# Patient Record
Sex: FEMALE | Race: BLACK OR AFRICAN AMERICAN | Employment: FULL TIME | URBAN - METROPOLITAN AREA
[De-identification: names, ages, dates, MRNs, and addresses within clinical notes are randomized per-mention and may not be internally consistent; named-entity substitution may affect disease eponyms.]

---

## 2017-11-12 ENCOUNTER — APPOINTMENT (OUTPATIENT)
Dept: GENERAL RADIOLOGY | Age: 47
End: 2017-11-12
Attending: EMERGENCY MEDICINE
Payer: COMMERCIAL

## 2017-11-12 ENCOUNTER — APPOINTMENT (OUTPATIENT)
Dept: CT IMAGING | Age: 47
End: 2017-11-12
Attending: EMERGENCY MEDICINE
Payer: COMMERCIAL

## 2017-11-12 ENCOUNTER — HOSPITAL ENCOUNTER (EMERGENCY)
Age: 47
Discharge: HOME | End: 2017-11-12
Attending: EMERGENCY MEDICINE
Payer: COMMERCIAL

## 2017-11-12 VITALS
RESPIRATION RATE: 24 BRPM | TEMPERATURE: 98.9 F | DIASTOLIC BLOOD PRESSURE: 71 MMHG | WEIGHT: 253.09 LBS | BODY MASS INDEX: 39.72 KG/M2 | HEART RATE: 86 BPM | HEIGHT: 67 IN | OXYGEN SATURATION: 95 % | SYSTOLIC BLOOD PRESSURE: 135 MMHG

## 2017-11-12 DIAGNOSIS — R50.9 FEVER, UNSPECIFIED FEVER CAUSE: ICD-10-CM

## 2017-11-12 DIAGNOSIS — R73.9 HYPERGLYCEMIA: Primary | ICD-10-CM

## 2017-11-12 LAB
ALBUMIN SERPL-MCNC: 3.9 G/DL (ref 3.4–5)
ALBUMIN/GLOB SERPL: 0.9 {RATIO} (ref 0.8–1.7)
ALP SERPL-CCNC: 225 U/L (ref 45–117)
ALT SERPL-CCNC: 60 U/L (ref 13–56)
ANION GAP SERPL CALC-SCNC: 10 MMOL/L (ref 3–18)
APPEARANCE UR: CLEAR
AST SERPL-CCNC: 35 U/L (ref 15–37)
ATRIAL RATE: 102 BPM
BACTERIA URNS QL MICRO: NEGATIVE /HPF
BASOPHILS # BLD: 0.1 K/UL (ref 0–0.06)
BASOPHILS NFR BLD: 0 % (ref 0–2)
BILIRUB DIRECT SERPL-MCNC: 0.1 MG/DL (ref 0–0.2)
BILIRUB SERPL-MCNC: 0.4 MG/DL (ref 0.2–1)
BILIRUB UR QL: NEGATIVE
BUN SERPL-MCNC: 15 MG/DL (ref 7–18)
BUN/CREAT SERPL: 14 (ref 12–20)
CALCIUM SERPL-MCNC: 9.9 MG/DL (ref 8.5–10.1)
CALCULATED P AXIS, ECG09: 72 DEGREES
CALCULATED R AXIS, ECG10: 52 DEGREES
CALCULATED T AXIS, ECG11: 107 DEGREES
CHLORIDE SERPL-SCNC: 97 MMOL/L (ref 100–108)
CK MB CFR SERPL CALC: NORMAL % (ref 0–4)
CK MB SERPL-MCNC: <1 NG/ML (ref 5–25)
CK SERPL-CCNC: 132 U/L (ref 26–192)
CO2 SERPL-SCNC: 31 MMOL/L (ref 21–32)
COLOR UR: YELLOW
CREAT SERPL-MCNC: 1.11 MG/DL (ref 0.6–1.3)
DIAGNOSIS, 93000: NORMAL
DIFFERENTIAL METHOD BLD: ABNORMAL
EOSINOPHIL # BLD: 0 K/UL (ref 0–0.4)
EOSINOPHIL NFR BLD: 0 % (ref 0–5)
EPITH CASTS URNS QL MICRO: NORMAL /LPF (ref 0–5)
ERYTHROCYTE [DISTWIDTH] IN BLOOD BY AUTOMATED COUNT: 13.4 % (ref 11.6–14.5)
FLUAV AG NPH QL IA: NEGATIVE
FLUBV AG NOSE QL IA: NEGATIVE
GLOBULIN SER CALC-MCNC: 4.3 G/DL (ref 2–4)
GLUCOSE BLD STRIP.AUTO-MCNC: 283 MG/DL (ref 70–110)
GLUCOSE BLD STRIP.AUTO-MCNC: 411 MG/DL (ref 70–110)
GLUCOSE SERPL-MCNC: 495 MG/DL (ref 74–99)
GLUCOSE UR STRIP.AUTO-MCNC: >1000 MG/DL
HCG UR QL: NEGATIVE
HCT VFR BLD AUTO: 44.9 % (ref 35–45)
HGB BLD-MCNC: 15 G/DL (ref 12–16)
HGB UR QL STRIP: ABNORMAL
KETONES UR QL STRIP.AUTO: 15 MG/DL
LACTATE BLD-SCNC: 1.6 MMOL/L (ref 0.4–2)
LEUKOCYTE ESTERASE UR QL STRIP.AUTO: NEGATIVE
LIPASE SERPL-CCNC: 108 U/L (ref 73–393)
LYMPHOCYTES # BLD: 1.4 K/UL (ref 0.9–3.6)
LYMPHOCYTES NFR BLD: 9 % (ref 21–52)
MCH RBC QN AUTO: 25.3 PG (ref 24–34)
MCHC RBC AUTO-ENTMCNC: 33.4 G/DL (ref 31–37)
MCV RBC AUTO: 75.6 FL (ref 74–97)
MONOCYTES # BLD: 0.4 K/UL (ref 0.05–1.2)
MONOCYTES NFR BLD: 3 % (ref 3–10)
NEUTS SEG # BLD: 13.6 K/UL (ref 1.8–8)
NEUTS SEG NFR BLD: 88 % (ref 40–73)
NITRITE UR QL STRIP.AUTO: NEGATIVE
P-R INTERVAL, ECG05: 182 MS
PH UR STRIP: 7.5 [PH] (ref 5–8)
PLATELET # BLD AUTO: 286 K/UL (ref 135–420)
PMV BLD AUTO: 12.1 FL (ref 9.2–11.8)
POTASSIUM SERPL-SCNC: 4.5 MMOL/L (ref 3.5–5.5)
PROT SERPL-MCNC: 8.2 G/DL (ref 6.4–8.2)
PROT UR STRIP-MCNC: 300 MG/DL
Q-T INTERVAL, ECG07: 370 MS
QRS DURATION, ECG06: 82 MS
QTC CALCULATION (BEZET), ECG08: 482 MS
RBC # BLD AUTO: 5.94 M/UL (ref 4.2–5.3)
RBC #/AREA URNS HPF: NORMAL /HPF (ref 0–5)
SODIUM SERPL-SCNC: 138 MMOL/L (ref 136–145)
SP GR UR REFRACTOMETRY: >1.03 (ref 1–1.03)
TROPONIN I SERPL-MCNC: 0.02 NG/ML (ref 0–0.06)
UROBILINOGEN UR QL STRIP.AUTO: 0.2 EU/DL (ref 0.2–1)
VENTRICULAR RATE, ECG03: 102 BPM
WBC # BLD AUTO: 15.5 K/UL (ref 4.6–13.2)
WBC URNS QL MICRO: NEGATIVE /HPF (ref 0–4)

## 2017-11-12 PROCEDURE — 87804 INFLUENZA ASSAY W/OPTIC: CPT | Performed by: EMERGENCY MEDICINE

## 2017-11-12 PROCEDURE — 74011636320 HC RX REV CODE- 636/320: Performed by: EMERGENCY MEDICINE

## 2017-11-12 PROCEDURE — 81001 URINALYSIS AUTO W/SCOPE: CPT | Performed by: EMERGENCY MEDICINE

## 2017-11-12 PROCEDURE — 99285 EMERGENCY DEPT VISIT HI MDM: CPT

## 2017-11-12 PROCEDURE — 96375 TX/PRO/DX INJ NEW DRUG ADDON: CPT

## 2017-11-12 PROCEDURE — 83690 ASSAY OF LIPASE: CPT | Performed by: EMERGENCY MEDICINE

## 2017-11-12 PROCEDURE — 74177 CT ABD & PELVIS W/CONTRAST: CPT

## 2017-11-12 PROCEDURE — 83605 ASSAY OF LACTIC ACID: CPT

## 2017-11-12 PROCEDURE — 93005 ELECTROCARDIOGRAM TRACING: CPT

## 2017-11-12 PROCEDURE — 82962 GLUCOSE BLOOD TEST: CPT

## 2017-11-12 PROCEDURE — 96361 HYDRATE IV INFUSION ADD-ON: CPT

## 2017-11-12 PROCEDURE — 74011250636 HC RX REV CODE- 250/636: Performed by: EMERGENCY MEDICINE

## 2017-11-12 PROCEDURE — 71010 XR CHEST PORT: CPT

## 2017-11-12 PROCEDURE — 82550 ASSAY OF CK (CPK): CPT | Performed by: EMERGENCY MEDICINE

## 2017-11-12 PROCEDURE — 96374 THER/PROPH/DIAG INJ IV PUSH: CPT

## 2017-11-12 PROCEDURE — 80048 BASIC METABOLIC PNL TOTAL CA: CPT | Performed by: EMERGENCY MEDICINE

## 2017-11-12 PROCEDURE — 74011636637 HC RX REV CODE- 636/637: Performed by: EMERGENCY MEDICINE

## 2017-11-12 PROCEDURE — 70450 CT HEAD/BRAIN W/O DYE: CPT

## 2017-11-12 PROCEDURE — 81025 URINE PREGNANCY TEST: CPT | Performed by: EMERGENCY MEDICINE

## 2017-11-12 PROCEDURE — 80076 HEPATIC FUNCTION PANEL: CPT | Performed by: EMERGENCY MEDICINE

## 2017-11-12 PROCEDURE — 85025 COMPLETE CBC W/AUTO DIFF WBC: CPT | Performed by: EMERGENCY MEDICINE

## 2017-11-12 PROCEDURE — 74011250637 HC RX REV CODE- 250/637: Performed by: EMERGENCY MEDICINE

## 2017-11-12 RX ORDER — NAPROXEN 500 MG/1
500 TABLET ORAL 2 TIMES DAILY WITH MEALS
COMMUNITY

## 2017-11-12 RX ORDER — HYDRALAZINE HYDROCHLORIDE 50 MG/1
50 TABLET, FILM COATED ORAL 3 TIMES DAILY
COMMUNITY

## 2017-11-12 RX ORDER — CARVEDILOL 6.25 MG/1
25 TABLET ORAL
Status: COMPLETED | OUTPATIENT
Start: 2017-11-12 | End: 2017-11-12

## 2017-11-12 RX ORDER — FUROSEMIDE 20 MG/1
20 TABLET ORAL DAILY
COMMUNITY

## 2017-11-12 RX ORDER — LISINOPRIL 20 MG/1
20 TABLET ORAL DAILY
COMMUNITY
End: 2020-03-04

## 2017-11-12 RX ORDER — GUAIFENESIN 100 MG/5ML
81 LIQUID (ML) ORAL DAILY
COMMUNITY

## 2017-11-12 RX ORDER — CARVEDILOL 25 MG/1
25 TABLET ORAL 2 TIMES DAILY WITH MEALS
COMMUNITY

## 2017-11-12 RX ORDER — LOVASTATIN 40 MG/1
40 TABLET ORAL
COMMUNITY

## 2017-11-12 RX ORDER — ONDANSETRON 2 MG/ML
4 INJECTION INTRAMUSCULAR; INTRAVENOUS
Status: COMPLETED | OUTPATIENT
Start: 2017-11-12 | End: 2017-11-12

## 2017-11-12 RX ORDER — AMLODIPINE BESYLATE 5 MG/1
10 TABLET ORAL DAILY
Status: DISCONTINUED | OUTPATIENT
Start: 2017-11-12 | End: 2017-11-12

## 2017-11-12 RX ORDER — NITROGLYCERIN 0.4 MG/1
0.4 TABLET SUBLINGUAL
COMMUNITY

## 2017-11-12 RX ORDER — AMLODIPINE BESYLATE 5 MG/1
10 TABLET ORAL
Status: COMPLETED | OUTPATIENT
Start: 2017-11-12 | End: 2017-11-12

## 2017-11-12 RX ORDER — AMLODIPINE BESYLATE 10 MG/1
10 TABLET ORAL DAILY
COMMUNITY

## 2017-11-12 RX ORDER — AMOXICILLIN AND CLAVULANATE POTASSIUM 875; 125 MG/1; MG/1
1 TABLET, FILM COATED ORAL 2 TIMES DAILY
Qty: 14 TAB | Refills: 0 | Status: SHIPPED | OUTPATIENT
Start: 2017-11-12 | End: 2017-11-19

## 2017-11-12 RX ORDER — AMOXICILLIN AND CLAVULANATE POTASSIUM 875; 125 MG/1; MG/1
1 TABLET, FILM COATED ORAL
Status: COMPLETED | OUTPATIENT
Start: 2017-11-12 | End: 2017-11-12

## 2017-11-12 RX ORDER — ERGOCALCIFEROL 1.25 MG/1
50000 CAPSULE ORAL
COMMUNITY

## 2017-11-12 RX ORDER — GLIPIZIDE 10 MG/1
10 TABLET ORAL 2 TIMES DAILY
COMMUNITY

## 2017-11-12 RX ORDER — METFORMIN HYDROCHLORIDE 1000 MG/1
1000 TABLET ORAL 2 TIMES DAILY WITH MEALS
COMMUNITY

## 2017-11-12 RX ORDER — ACETAMINOPHEN 500 MG
1000 TABLET ORAL ONCE
Status: COMPLETED | OUTPATIENT
Start: 2017-11-12 | End: 2017-11-12

## 2017-11-12 RX ORDER — CARVEDILOL 6.25 MG/1
25 TABLET ORAL 2 TIMES DAILY WITH MEALS
Status: DISCONTINUED | OUTPATIENT
Start: 2017-11-12 | End: 2017-11-12

## 2017-11-12 RX ORDER — HYDRALAZINE HYDROCHLORIDE 25 MG/1
50 TABLET, FILM COATED ORAL 3 TIMES DAILY
Status: DISCONTINUED | OUTPATIENT
Start: 2017-11-12 | End: 2017-11-12

## 2017-11-12 RX ORDER — HYDRALAZINE HYDROCHLORIDE 25 MG/1
50 TABLET, FILM COATED ORAL
Status: COMPLETED | OUTPATIENT
Start: 2017-11-12 | End: 2017-11-12

## 2017-11-12 RX ORDER — ACETAMINOPHEN 325 MG/1
650 TABLET ORAL ONCE
Status: DISCONTINUED | OUTPATIENT
Start: 2017-11-12 | End: 2017-11-12 | Stop reason: HOSPADM

## 2017-11-12 RX ADMIN — AMOXICILLIN AND CLAVULANATE POTASSIUM 1 TABLET: 875; 125 TABLET, FILM COATED ORAL at 11:36

## 2017-11-12 RX ADMIN — SODIUM CHLORIDE 1000 ML: 900 INJECTION, SOLUTION INTRAVENOUS at 08:52

## 2017-11-12 RX ADMIN — SODIUM CHLORIDE 1000 ML: 900 INJECTION, SOLUTION INTRAVENOUS at 10:58

## 2017-11-12 RX ADMIN — HYDRALAZINE HYDROCHLORIDE 50 MG: 25 TABLET, FILM COATED ORAL at 09:42

## 2017-11-12 RX ADMIN — CARVEDILOL 25 MG: 6.25 TABLET, FILM COATED ORAL at 09:41

## 2017-11-12 RX ADMIN — INSULIN HUMAN 11 UNITS: 100 INJECTION, SOLUTION PARENTERAL at 09:42

## 2017-11-12 RX ADMIN — AMLODIPINE BESYLATE 10 MG: 5 TABLET ORAL at 09:42

## 2017-11-12 RX ADMIN — ACETAMINOPHEN 1000 MG: 500 TABLET ORAL at 08:48

## 2017-11-12 RX ADMIN — IOPAMIDOL 100 ML: 612 INJECTION, SOLUTION INTRAVENOUS at 09:33

## 2017-11-12 RX ADMIN — ONDANSETRON 4 MG: 2 INJECTION INTRAMUSCULAR; INTRAVENOUS at 10:29

## 2017-11-12 NOTE — ED NOTES
Report given to [facility]. Tylenol reordered due to possibility of vomiting last dose. When rechecked temp, now 98.9. Dr. Ana Middleton aware and Tylenol held. Patient condition improving, more alert now. patient's mother suggests treating patient for possible sinus infection since source of fever unfound. Will continue to monitor closely while awaiting further orders.

## 2017-11-12 NOTE — LETTER
[address] [place] [address] [facility] 
[address] [facility] [address] 
[phone] Work/School Note Date: 11/12/2017 To Whom It May concern: 
 
Tod Farrar was seen and treated today in the emergency room by the following provider(s): 
No providers found. Tod Farrar may return to work on 11/15/17 Suzanna López Sincerely, 
 
 
 
 
Kee Fournier RN

## 2017-11-12 NOTE — LETTER
[address] [place] [address] [facility] 
[address] [facility] [address] [phone] Work/School Note Date: 11/12/2017 To Whom It May concern: 
 
Rickie Miranda was seen and treated today in the emergency room by the following provider(s): 
No providers found. Rickie Miranda may return to work on 11/14/17 Cielo Dao Sincerely, 
 
 
 
 
Hilaria Ramirez RN

## 2017-11-12 NOTE — ED PROVIDER NOTES
HPI Comments: 8:31 AM: Joseluis Alexandre is a 49y.o. year old female with hx of DM ad HTN presenting to the ED with c/o constant and increasing AMS onset 10 hours. Wife states pt was baseline yesterday and then around 11pm (10 hrs ago) pt began complaining of dizziness, left eye blurred vision, and HA. Pt sx worsened to include fever, nausea, diarrhea, and being in and out of responsiveness per wife. Denies SOB, CP, bilateral ear pain, dysuria, bilateral LE edema, or abdominal pain. Pt has been inconsistent with medications secondary to trying to conserve quantity before refill. Takes oral DM meds, no insulin. Denies ETOH/drug use. Pt mother and wife at bedside. The history is provided by the patient and the spouse. Past Medical History:   Diagnosis Date    Cardiomyopathy [facility])     CHF (congestive heart failure) (HCC)     Diabetes mellitus ([facility]     Diabetic neuropathy (HCC)     Hypertension     Microalbuminuria     Mixed hyperlipidemia     Obesity     Ovarian cancer ([facility]     Vitamin D deficiency        Past Surgical History:   Procedure Laterality Date    VAG HYST,UTERUS >250 GMS,REM TUBE/OVARY           No family history on file. Social History     Social History    Marital status: SINGLE     Spouse name: N/A    Number of children: N/A    Years of education: N/A     Occupational History    Not on file. Social History Main Topics    Smoking status: Not on file    Smokeless tobacco: Not on file    Alcohol use Not on file    Drug use: Not on file    Sexual activity: Not on file     Other Topics Concern    Not on file     Social History Narrative    No narrative on file         ALLERGIES: Review of patient's allergies indicates no known allergies. Review of Systems   Constitutional: Positive for fever. Eyes: Positive for visual disturbance (left eye blurred ). Respiratory: Negative for shortness of breath.     Cardiovascular: Negative for chest pain and leg swelling. Gastrointestinal: Positive for diarrhea and nausea. Negative for abdominal pain. Genitourinary: Negative for dysuria. Neurological: Positive for dizziness and headaches. AMS   All other systems reviewed and are negative. Vitals:    11/12/17 0817 11/12/17 0900   BP: (!) 203/87 (!) 214/78   Pulse: 100 (!) 107   Resp: 26 25   Temp: (!) 102.7 °F (39.3 °C)    SpO2: 100% 96%   Weight: 114.8 kg (253 lb 1.4 oz)    Height: 5' 7\" (1.702 m)             Physical Exam   Constitutional: She is oriented to person, place, and time. She appears well-developed. HENT:   Head: Normocephalic and atraumatic. Eyes: EOM are normal. Pupils are equal, round, and reactive to light. Rt eye chronically discong gaze, TMs clear bilaterally, OP clear no exudates   Neck: Normal range of motion. Cardiovascular: Regular rhythm. Exam reveals no gallop and no friction rub. No murmur heard. Mild Tachycardia   Pulmonary/Chest: Effort normal and breath sounds normal. No stridor. No respiratory distress. She has no wheezes. Abdominal: Soft. There is no tenderness. Musculoskeletal: Normal range of motion. She exhibits no tenderness. Neurological: She is oriented to person, place, and time. Mildly lethargic but still responsive and answers questions   Skin: Skin is warm and dry. She is not diaphoretic. Psychiatric: She has a normal mood and affect. Her behavior is normal.   Nursing note and vitals reviewed. MDM  Number of Diagnoses or Management Options  Hyperglycemia:   Diagnosis management comments: Pt here febrile, mildly altered, and hypertensive + hyperglycemia to 400's off her HTN and DM meds for the past week while awaiting insurance. Her AMS could be due to metabolic issues such as DM- less likely toxic as mother and wife have been close to her and do not report any use. Could be hypertensive emergency so will check CT head for any issues, bleed. Sx for 12 hours now so no stroke activation. Working up fever with flu swab, UA, CXR. Ab is non tender but will check CT for any intra ab process. TM were clear, no rashes noted anywhere.  If likely DKA treatment does not improve sx or if no other cause results from the labs, may need to consider LP for mengingitis, but lower likelihood due to lack of neck issues, severe headache, etc.     ED Course       Procedures      Vitals:  Patient Vitals for the past 12 hrs:   Temp Pulse Resp BP SpO2   11/12/17 1139 98.9 °F (37.2 °C) - - - -   11/12/17 1130 - (!) 101 22 169/77 99 %   11/12/17 1117 98.9 °F (37.2 °C) - - - -   11/12/17 1100 - (!) 101 22 195/87 98 %   11/12/17 0900 - (!) 107 25 (!) 214/78 96 %   11/12/17 0817 (!) 102.7 °F (39.3 °C) 100 26 (!) 203/87 100 %       Medications Ordered:  Medications   sodium chloride 0.9 % bolus infusion 1,000 mL (0 mL IntraVENous IV Completed 11/12/17 0948)   acetaminophen (TYLENOL) tablet 1,000 mg (1,000 mg Oral Given 11/12/17 0848)   insulin regular (NOVOLIN R, HUMULIN R) injection 11 Units (11 Units IntraVENous Given 11/12/17 0942)   iopamidol (ISOVUE 300) 61 % contrast injection 100-500 mL (100 mL IntraVENous Given 11/12/17 0933)   amLODIPine (NORVASC) tablet 10 mg (10 mg Oral Given 11/12/17 0942)   carvedilol (COREG) tablet 25 mg (25 mg Oral Given 11/12/17 0941)   hydrALAZINE (APRESOLINE) tablet 50 mg (50 mg Oral Given 11/12/17 0942)   ondansetron (ZOFRAN) injection 4 mg (4 mg IntraVENous Given 11/12/17 1029)       Lab Findings:  Recent Results (from the past 12 hour(s))   GLUCOSE, POC    Collection Time: 11/12/17  8:21 AM   Result Value Ref Range    Glucose (POC) 411 (HH) 70 - 110 mg/dL   POC LACTIC ACID    Collection Time: 11/12/17  8:26 AM   Result Value Ref Range    Lactic Acid (POC) 1.6 0.4 - 2.0 mmol/L   CBC WITH AUTOMATED DIFF    Collection Time: 11/12/17  8:30 AM   Result Value Ref Range    WBC 15.5 (H) 4.6 - 13.2 K/uL    RBC 5.94 (H) 4.20 - 5.30 M/uL    HGB 15.0 12.0 - 16.0 g/dL    HCT 44.9 35.0 - 45.0 % MCV 75.6 74.0 - 97.0 FL    MCH 25.3 24.0 - 34.0 PG    MCHC 33.4 31.0 - 37.0 g/dL    RDW 13.4 11.6 - 14.5 %    PLATELET 896 767 - 636 K/uL    MPV 12.1 (H) 9.2 - 11.8 FL    NEUTROPHILS 88 (H) 40 - 73 %    LYMPHOCYTES 9 (L) 21 - 52 %    MONOCYTES 3 3 - 10 %    EOSINOPHILS 0 0 - 5 %    BASOPHILS 0 0 - 2 %    ABS. NEUTROPHILS 13.6 (H) 1.8 - 8.0 K/UL    ABS. LYMPHOCYTES 1.4 0.9 - 3.6 K/UL    ABS. MONOCYTES 0.4 0.05 - 1.2 K/UL    ABS. EOSINOPHILS 0.0 0.0 - 0.4 K/UL    ABS. BASOPHILS 0.1 (H) 0.0 - 0.06 K/UL    DF AUTOMATED     METABOLIC PANEL, BASIC    Collection Time: 11/12/17  8:30 AM   Result Value Ref Range    Sodium 138 136 - 145 mmol/L    Potassium 4.5 3.5 - 5.5 mmol/L    Chloride 97 (L) 100 - 108 mmol/L    CO2 31 21 - 32 mmol/L    Anion gap 10 3.0 - 18 mmol/L    Glucose 495 (HH) 74 - 99 mg/dL    BUN 15 7.0 - 18 MG/DL    Creatinine 1.11 0.6 - 1.3 MG/DL    BUN/Creatinine ratio 14 12 - 20      GFR est AA >60 >60 ml/min/1.73m2    GFR est non-AA 52 (L) >60 ml/min/1.73m2    Calcium 9.9 8.5 - 10.1 MG/DL   HEPATIC FUNCTION PANEL    Collection Time: 11/12/17  8:30 AM   Result Value Ref Range    Protein, total 8.2 6.4 - 8.2 g/dL    Albumin 3.9 3.4 - 5.0 g/dL    Globulin 4.3 (H) 2.0 - 4.0 g/dL    A-G Ratio 0.9 0.8 - 1.7      Bilirubin, total 0.4 0.2 - 1.0 MG/DL    Bilirubin, direct 0.1 0.0 - 0.2 MG/DL    Alk.  phosphatase 225 (H) 45 - 117 U/L    AST (SGOT) 35 15 - 37 U/L    ALT (SGPT) 60 (H) 13 - 56 U/L   LIPASE    Collection Time: 11/12/17  8:30 AM   Result Value Ref Range    Lipase 108 73 - 393 U/L   CARDIAC PANEL,(CK, CKMB & TROPONIN)    Collection Time: 11/12/17  8:30 AM   Result Value Ref Range     26 - 192 U/L    CK - MB <1.0 <3.6 ng/ml    CK-MB Index  0.0 - 4.0 %     CALCULATION NOT PERFORMED WHEN RESULT IS BELOW LINEAR LIMIT    Troponin-I, Qt. 0.02 0.00 - 0.06 NG/ML   EKG, 12 LEAD, INITIAL    Collection Time: 11/12/17  8:43 AM   Result Value Ref Range    Ventricular Rate 102 BPM    Atrial Rate 102 BPM    P-R Interval 182 ms    QRS Duration 82 ms    Q-T Interval 370 ms    QTC Calculation (Bezet) 482 ms    Calculated P Axis 72 degrees    Calculated R Axis 52 degrees    Calculated T Axis 107 degrees    Diagnosis       Sinus tachycardia with occasional premature ventricular complexes  Marked ST abnormality, possible lateral subendocardial injury  Abnormal ECG  No previous ECGs available     INFLUENZA A & B AG (RAPID TEST)    Collection Time: 11/12/17  8:45 AM   Result Value Ref Range    Influenza A Antigen NEGATIVE  NEG      Influenza B Antigen NEGATIVE  NEG     URINALYSIS W/ RFLX MICROSCOPIC    Collection Time: 11/12/17  9:25 AM   Result Value Ref Range    Color YELLOW      Appearance CLEAR      Specific gravity >1.030 (H) 1.005 - 1.030    pH (UA) 7.5 5.0 - 8.0      Protein 300 (A) NEG mg/dL    Glucose >1000 (A) NEG mg/dL    Ketone 15 (A) NEG mg/dL    Bilirubin NEGATIVE  NEG      Blood SMALL (A) NEG      Urobilinogen 0.2 0.2 - 1.0 EU/dL    Nitrites NEGATIVE  NEG      Leukocyte Esterase NEGATIVE  NEG     HCG URINE, QL    Collection Time: 11/12/17  9:25 AM   Result Value Ref Range    HCG urine, Ql. NEGATIVE  NEG     URINE MICROSCOPIC ONLY    Collection Time: 11/12/17  9:25 AM   Result Value Ref Range    WBC NEGATIVE  0 - 4 /hpf    RBC 0 to 3 0 - 5 /hpf    Epithelial cells 2+ 0 - 5 /lpf    Bacteria NEGATIVE  NEG /hpf       EKG Interpretation by ED physician:  8:46 AM.  Sinus Tachycardic. Rate 102. PVC. Non-specific ST changes. X-ray, CT or radiology findings or impressions:  XR CHEST PORT   Final Result   IMPRESSION:     No acute findings. CT HEAD WO CONT      Final Result  IMPRESSION:       No acute intracranial process. Mild sequela of chronic microvascular ischemic disease. Trace right mastoid effusion. CT ABD PELV W CONT   Final Result  IMPRESSION:      Hepatomegaly. Diagnosis:   1. Hyperglycemia      Reevaluation:  10:52 AM - Family says pt was never altered, just fatigued.   Presented current lab test finding and CT results. Discussed possibly of lumbar puncture which was declined so far. Blood glucose improved down to 280s after insulin. Pt still mildy tachycardic, will try additional fluids. Family would like to go home if at all possible. 11:20AM- pt very much improved after fluids, insulin, tylenol. Now afebrile, not tachy any more. Still declining further work up. Again reiterated neg work up so far, perhaps maybe a trace mastoid effusion but no clinical signs of mastoiditis. Pt does have hx of sinus infections so I am fine starting her on augmentin now, but pt and family fully understand that there could still be a more serious infection, sepsis or other issue currently. They will assure med compliance, and her HTN has improved after I gave her a home dose of her meds. We are currently planning for outpt management of her issues, with very close PMD follow up.    12:50PM- re eval- now heart rate in 80's, back to baseline per family. Her wife called in all of her medications so she has everything again. I counseled them on DM and HTN management. She tolerated the augmentin well- I re-iterated all of the above and the worry that she may have another occult infection, but she was back to baseline and normal vitals and much improved at discharge.     -They verbally convey understanding and agreement of the signs, symptoms, diagnosis, treatment and prognosis and additionally agree to follow up as discussed. All questions were answered, and we reviewed pertinent return precautions as seen in the discharge paperwork. Pt understood follow up instructions, and would return to the ED if any worsening or concerns. Disposition: Discharge    Follow-up Information     None           Patient's Medications   Start Taking    No medications on file   Continue Taking    AMLODIPINE (NORVASC) 10 MG TABLET    Take 10 mg by mouth daily. ASPIRIN 81 MG CHEWABLE TABLET    Take 81 mg by mouth daily.     CARVEDILOL (COREG) 25 MG TABLET    Take 25 mg by mouth two (2) times daily (with meals). ERGOCALCIFEROL (DRISDOL) 50,000 UNIT CAPSULE    Take 50,000 Units by mouth. FUROSEMIDE (LASIX) 20 MG TABLET    Take 20 mg by mouth daily. GLIPIZIDE (GLUCOTROL) 10 MG TABLET    Take 10 mg by mouth two (2) times a day. HYDRALAZINE (APRESOLINE) 50 MG TABLET    Take 50 mg by mouth three (3) times daily. LISINOPRIL (PRINIVIL, ZESTRIL) 20 MG TABLET    Take 20 mg by mouth daily. LOVASTATIN (MEVACOR) 40 MG TABLET    Take 40 mg by mouth nightly. METFORMIN (GLUCOPHAGE) 1,000 MG TABLET    Take 1,000 mg by mouth two (2) times daily (with meals). NAPROXEN (NAPROSYN) 500 MG TABLET    Take 500 mg by mouth two (2) times daily (with meals). NITROGLYCERIN (NITROSTAT) 0.4 MG SL TABLET    0.4 mg by SubLINGual route every five (5) minutes as needed for Chest Pain. These Medications have changed    No medications on file   Stop Taking    No medications on file       Scribe Amparo Montano [address] acting as a scribe for and in the presence of Geri Crawford MD      November 12, 2017 at 8:56 AM       Provider Attestation:      I personally performed the services described in the documentation, reviewed the documentation, as recorded by the scribe in my presence, and it accurately and completely records my words and actions.  November 12, 2017 at 8:56 AM - Geri Crawford MD

## 2017-11-12 NOTE — ED NOTES
Patient back from CT. Per CT tech, patient vomited following scan. PO medications scanned in computer while patient getting ready to go to scan and not taken yet. Dr. Oconnor Look aware and verbal order obtained for Zofran. Patient now resting quietly.

## 2017-11-12 NOTE — ED TRIAGE NOTES
Per wife, patient was c/o dizziness last night and blurred left eye. This morning was confused, vomited and had diarrhea multiple times.  Febrile upon arrival

## 2017-11-12 NOTE — DISCHARGE INSTRUCTIONS
Learning About Fever  What is a fever? A fever is a high body temperature. It's one way your body fights being sick. A fever shows that the body is responding to infection or other illnesses, both minor and severe. A fever is a symptom, not an illness by itself. A fever can be a sign that you are ill, but most fevers are not caused by a serious problem. You may have a fever with a minor illness, such as a cold. But sometimes a very serious infection may cause little or no fever. It is important to look at other symptoms, other conditions you have, and how you feel in general. In children, notice how they act and see what symptoms they complain of. What is a normal body temperature? A normal body temperature is about 98. 6ºF. Some people have a normal temperature that is a little higher or a little lower than this. Your temperature may be a little lower in the morning than it is later in the day. It may go up during hot weather or when you exercise, wear heavy clothes, or take a hot bath. Your temperature may also be different depending on how you take it. A temperature taken in the mouth (oral) or under the arm may be a little lower than your core temperature (rectal). What is a fever temperature? A core temperature of 100.4°F or above is considered a fever. What can cause a fever? A fever may be caused by:  · Infections. This is the most common cause of a fever. Examples of infections that can cause a fever include the flu, a kidney infection, or pneumonia. · Some medicines. · Severe trauma or injury, such as a heart attack, stroke, heatstroke, or burns. · Other medical conditions, such as arthritis and some cancers. How can you treat a fever at home? · Ask your doctor if you can take an over-the-counter pain medicine, such as acetaminophen (Tylenol), ibuprofen (Advil, Motrin), or naproxen (Aleve). Be safe with medicines. Read and follow all instructions on the label.   · To prevent dehydration, drink plenty of fluids. Choose water and other caffeine-free clear liquids until you feel better. If you have kidney, heart, or liver disease and have to limit fluids, talk with your doctor before you increase the amount of fluids you drink. Follow-up care is a key part of your treatment and safety. Be sure to make and go to all appointments, and call your doctor if you are having problems. It's also a good idea to know your test results and keep a list of the medicines you take. Where can you learn more? Go to http://aparna-rishi.info/. Enter A316 in the search box to learn more about \"Learning About Fever. \"  Current as of: March 20, 2017  Content Version: 11.4  © 6003-3137 [employer]. Care instructions adapted under license by [employer] (which disclaims liability or warranty for this information). If you have questions about a medical condition or this instruction, always ask your healthcare professional. [address] any warranty or liability for your use of this information.

## 2019-01-20 ENCOUNTER — APPOINTMENT (OUTPATIENT)
Dept: GENERAL RADIOLOGY | Age: 49
End: 2019-01-20
Attending: EMERGENCY MEDICINE
Payer: COMMERCIAL

## 2019-01-20 ENCOUNTER — HOSPITAL ENCOUNTER (EMERGENCY)
Age: 49
Discharge: HOME | End: 2019-01-20
Attending: EMERGENCY MEDICINE | Admitting: EMERGENCY MEDICINE
Payer: COMMERCIAL

## 2019-01-20 ENCOUNTER — APPOINTMENT (OUTPATIENT)
Dept: CT IMAGING | Age: 49
End: 2019-01-20
Attending: EMERGENCY MEDICINE
Payer: COMMERCIAL

## 2019-01-20 VITALS
BODY MASS INDEX: 35.99 KG/M2 | HEIGHT: 69 IN | OXYGEN SATURATION: 100 % | WEIGHT: 243 LBS | SYSTOLIC BLOOD PRESSURE: 202 MMHG | DIASTOLIC BLOOD PRESSURE: 88 MMHG | HEART RATE: 82 BPM | RESPIRATION RATE: 17 BRPM

## 2019-01-20 DIAGNOSIS — R07.89 ATYPICAL CHEST PAIN: ICD-10-CM

## 2019-01-20 DIAGNOSIS — R73.9 HYPERGLYCEMIA: Primary | ICD-10-CM

## 2019-01-20 DIAGNOSIS — Z91.199 MEDICAL NON-COMPLIANCE: ICD-10-CM

## 2019-01-20 LAB
ADMINISTERED INITIALS, ADMINIT: NORMAL
ADMINISTERED INITIALS, ADMINIT: NORMAL
ALBUMIN SERPL-MCNC: 3.5 G/DL (ref 3.4–5)
ALBUMIN/GLOB SERPL: 0.7 {RATIO} (ref 0.8–1.7)
ALP SERPL-CCNC: 144 U/L (ref 45–117)
ALT SERPL-CCNC: 21 U/L (ref 13–56)
ANION GAP SERPL CALC-SCNC: 11 MMOL/L (ref 3–18)
AST SERPL-CCNC: 13 U/L (ref 15–37)
BASOPHILS # BLD: 0.1 K/UL (ref 0–0.1)
BASOPHILS NFR BLD: 1 % (ref 0–2)
BILIRUB SERPL-MCNC: 0.3 MG/DL (ref 0.2–1)
BNP SERPL-MCNC: 556 PG/ML (ref 0–450)
BUN SERPL-MCNC: 12 MG/DL (ref 7–18)
BUN/CREAT SERPL: 13 (ref 12–20)
CALCIUM SERPL-MCNC: 9.8 MG/DL (ref 8.5–10.1)
CHLORIDE SERPL-SCNC: 98 MMOL/L (ref 100–108)
CK MB CFR SERPL CALC: 0.9 % (ref 0–4)
CK MB SERPL-MCNC: 1.3 NG/ML (ref 5–25)
CK SERPL-CCNC: 142 U/L (ref 26–192)
CO2 SERPL-SCNC: 27 MMOL/L (ref 21–32)
CREAT SERPL-MCNC: 0.9 MG/DL (ref 0.6–1.3)
D50 ADMINISTERED, D50ADM: 0 ML
D50 ADMINISTERED, D50ADM: 0 ML
D50 ORDER, D50ORD: 0 ML
D50 ORDER, D50ORD: 0 ML
DIFFERENTIAL METHOD BLD: ABNORMAL
EOSINOPHIL # BLD: 0.2 K/UL (ref 0–0.4)
EOSINOPHIL NFR BLD: 1 % (ref 0–5)
ERYTHROCYTE [DISTWIDTH] IN BLOOD BY AUTOMATED COUNT: 14.9 % (ref 11.6–14.5)
EST. AVERAGE GLUCOSE BLD GHB EST-MCNC: 301 MG/DL
GLOBULIN SER CALC-MCNC: 4.9 G/DL (ref 2–4)
GLUCOSE BLD STRIP.AUTO-MCNC: 200 MG/DL (ref 70–110)
GLUCOSE BLD STRIP.AUTO-MCNC: 251 MG/DL (ref 70–110)
GLUCOSE SERPL-MCNC: 385 MG/DL (ref 74–99)
GLUCOSE, GLC: 251 MG/DL
GLUCOSE, GLC: 385 MG/DL
HBA1C MFR BLD: 12.1 % (ref 4.2–5.6)
HCT VFR BLD AUTO: 41.5 % (ref 35–45)
HGB BLD-MCNC: 13.8 G/DL (ref 12–16)
HIGH TARGET, HITG: 180 MG/DL
HIGH TARGET, HITG: 180 MG/DL
INSULIN ADMINSTERED, INSADM: 1.9 UNITS/HOUR
INSULIN ADMINSTERED, INSADM: 6.5 UNITS/HOUR
INSULIN ORDER, INSORD: 1.9 UNITS/HOUR
INSULIN ORDER, INSORD: 6.5 UNITS/HOUR
LOW TARGET, LOT: 140 MG/DL
LOW TARGET, LOT: 140 MG/DL
LYMPHOCYTES # BLD: 3.3 K/UL (ref 0.9–3.6)
LYMPHOCYTES NFR BLD: 25 % (ref 21–52)
MCH RBC QN AUTO: 25.8 PG (ref 24–34)
MCHC RBC AUTO-ENTMCNC: 33.3 G/DL (ref 31–37)
MCV RBC AUTO: 77.7 FL (ref 74–97)
MINUTES UNTIL NEXT BG, NBG: 60 MIN
MINUTES UNTIL NEXT BG, NBG: 60 MIN
MONOCYTES # BLD: 1 K/UL (ref 0.05–1.2)
MONOCYTES NFR BLD: 8 % (ref 3–10)
MULTIPLIER, MUL: 0.01
MULTIPLIER, MUL: 0.02
NEUTS SEG # BLD: 8.7 K/UL (ref 1.8–8)
NEUTS SEG NFR BLD: 65 % (ref 40–73)
ORDER INITIALS, ORDINIT: NORMAL
ORDER INITIALS, ORDINIT: NORMAL
PLATELET # BLD AUTO: 344 K/UL (ref 135–420)
PMV BLD AUTO: 12 FL (ref 9.2–11.8)
POTASSIUM SERPL-SCNC: 3.9 MMOL/L (ref 3.5–5.5)
PROT SERPL-MCNC: 8.4 G/DL (ref 6.4–8.2)
RBC # BLD AUTO: 5.34 M/UL (ref 4.2–5.3)
SODIUM SERPL-SCNC: 136 MMOL/L (ref 136–145)
TROPONIN I SERPL-MCNC: <0.02 NG/ML (ref 0–0.06)
WBC # BLD AUTO: 13.2 K/UL (ref 4.6–13.2)

## 2019-01-20 PROCEDURE — 83036 HEMOGLOBIN GLYCOSYLATED A1C: CPT

## 2019-01-20 PROCEDURE — 74011636637 HC RX REV CODE- 636/637: Performed by: EMERGENCY MEDICINE

## 2019-01-20 PROCEDURE — 71046 X-RAY EXAM CHEST 2 VIEWS: CPT

## 2019-01-20 PROCEDURE — 82962 GLUCOSE BLOOD TEST: CPT

## 2019-01-20 PROCEDURE — 70450 CT HEAD/BRAIN W/O DYE: CPT

## 2019-01-20 PROCEDURE — 83880 ASSAY OF NATRIURETIC PEPTIDE: CPT

## 2019-01-20 PROCEDURE — 85025 COMPLETE CBC W/AUTO DIFF WBC: CPT

## 2019-01-20 PROCEDURE — 99285 EMERGENCY DEPT VISIT HI MDM: CPT

## 2019-01-20 PROCEDURE — 93005 ELECTROCARDIOGRAM TRACING: CPT

## 2019-01-20 PROCEDURE — 96366 THER/PROPH/DIAG IV INF ADDON: CPT

## 2019-01-20 PROCEDURE — 82550 ASSAY OF CK (CPK): CPT

## 2019-01-20 PROCEDURE — 74011000258 HC RX REV CODE- 258: Performed by: EMERGENCY MEDICINE

## 2019-01-20 PROCEDURE — 80053 COMPREHEN METABOLIC PANEL: CPT

## 2019-01-20 PROCEDURE — 96365 THER/PROPH/DIAG IV INF INIT: CPT

## 2019-01-20 RX ORDER — DEXTROSE 50 % IN WATER (D50W) INTRAVENOUS SYRINGE
25-50 AS NEEDED
Status: DISCONTINUED | OUTPATIENT
Start: 2019-01-20 | End: 2019-01-20 | Stop reason: HOSPADM

## 2019-01-20 RX ORDER — MAGNESIUM SULFATE 100 %
4 CRYSTALS MISCELLANEOUS AS NEEDED
Status: DISCONTINUED | OUTPATIENT
Start: 2019-01-20 | End: 2019-01-20 | Stop reason: HOSPADM

## 2019-01-20 RX ADMIN — SODIUM CHLORIDE 6.5 UNITS/HR: 900 INJECTION, SOLUTION INTRAVENOUS at 12:55

## 2019-01-20 NOTE — LETTER
NOTIFICATION RETURN TO WORK / SCHOOL 
 
1/20/2019 3:25 PM 
 
Ms. NICHOLAS Martines [address] [facility] [address] To Whom It May Concern: 
 
Isaiah Solomon is currently under the care of [id number] [facility] EMERGENCY DEPT. She will return to work/school on: Tuesday, January 22, 2019 If there are questions or concerns please have the patient contact our office.  
 
 
 
Sincerely, 
 
 
Preston Li MD

## 2019-01-20 NOTE — ED PROVIDER NOTES
EMERGENCY DEPARTMENT HISTORY AND PHYSICAL EXAM 
 
12:18 PM 
 
 
Date: 1/20/2019 Patient Name: Nella Bynum History of Presenting Illness Chief Complaint Patient presents with  Chest Pain  Numbness History Provided By: Patient Chief Complaint: Numbness; Chest Pain Duration:  Days Timing:  Constant Location: Left arm Quality: N/A Severity: N/A Modifying Factors: None Associated Symptoms: denies any other associated signs or symptoms Additional History (Context): Nella Bynum is a 51 y.o. female with PMHx of HTN, DM, and CHF presenting to the ED c/o constant left arm numbness for the past 3 days. Pt also reports chest pain 5 days ago, states that resolved and denies any chest pain since then. Reports no modifying factors for her symptoms. Denies shortness of breath, abdominal pain, and any other symptoms or complaints. PCP: Shannan Landau, MD 
 
Current Facility-Administered Medications Medication Dose Route Frequency Provider Last Rate Last Dose  glucose chewable tablet 16 g  4 Tab Oral PRN Halle Marc MD      
 [facility]) injection 1 mg  1 mg IntraMUSCular PRN Halle Marc MD      
 dextrose (D50W) injection syrg 12.5-25 g  25-50 mL IntraVENous PRN Halle Marc MD      
 insulin regular (NOVOLIN R, HUMULIN R) 100 Units in 0.9% sodium chloride 100 mL infusion  0-50 Units/hr IntraVENous TITRATE Halle Marc MD 1.9 mL/hr at 01/20/19 1357 1.9 Units/hr at 01/20/19 1357 Current Outpatient Medications Medication Sig Dispense Refill  ergocalciferol (DRISDOL) 50,000 unit capsule Take 50,000 Units by mouth.  lovastatin (MEVACOR) 40 mg tablet Take 40 mg by mouth nightly.  amLODIPine (NORVASC) 10 mg tablet Take 10 mg by mouth daily.  furosemide (LASIX) 20 mg tablet Take 20 mg by mouth daily.  carvedilol (COREG) 25 mg tablet Take 25 mg by mouth two (2) times daily (with meals).  glipiZIDE (GLUCOTROL) 10 mg tablet Take 10 mg by mouth two (2) times a day.  naproxen (NAPROSYN) 500 mg tablet Take 500 mg by mouth two (2) times daily (with meals).  hydrALAZINE (APRESOLINE) 50 mg tablet Take 50 mg by mouth three (3) times daily.  aspirin 81 mg chewable tablet Take 81 mg by mouth daily.  metFORMIN (GLUCOPHAGE) 1,000 mg tablet Take 1,000 mg by mouth two (2) times daily (with meals).  lisinopril (PRINIVIL, ZESTRIL) 20 mg tablet Take 20 mg by mouth daily.  nitroglycerin (NITROSTAT) 0.4 mg SL tablet 0.4 mg by SubLINGual route every five (5) minutes as needed for Chest Pain. Past History Past Medical History: 
Past Medical History:  
Diagnosis Date  [facility]omy[facility] [address] [facility]  CHF (congestive heart failure) ([facility] [address] [facility]  Diabetes mellitus ([facility] [address] [facility]  Diabetic neuropathy ([facility] [address] [facility]  Hypertension  Microalbuminuria  Mixed hyperlipidemia  Obesity  Ovarian cancer ([facility] [address]  Vitamin D deficiency Past Surgical History: 
Past Surgical History:  
Procedure Laterality Date  VAG HYST,UTERUS >250 GMS,REM TUBE/OVARY Family History: 
History reviewed. No pertinent family history. Social History: 
Social History Tobacco Use  Smoking status: Not on file Substance Use Topics  Alcohol use: Not on file  Drug use: Not on file Allergies: 
No Known Allergies Review of Systems Review of Systems Constitutional: Negative for activity change, fatigue and fever. HENT: Negative for congestion and rhinorrhea. Eyes: Negative for visual disturbance. Respiratory: Negative for shortness of breath. Cardiovascular: Positive for chest pain (resolved). Negative for palpitations. Gastrointestinal: Negative for abdominal pain, diarrhea, nausea and vomiting. Genitourinary: Negative for dysuria and hematuria. Musculoskeletal: Negative for back pain. Skin: Negative for rash. Neurological: Positive for numbness (left arm). Negative for dizziness and light-headedness. All other systems reviewed and are negative. Physical Exam  
 
Visit Vitals BP (!) 202/88 Pulse 82 Resp 17 Ht 5' 9\" (1.753 m) Wt 110.2 kg (243 lb) SpO2 100% BMI 35.88 kg/m² Physical Exam  
Constitutional: She is oriented to person, place, and time. She appears well-developed and well-nourished. No distress. HENT:  
Head: Normocephalic and atraumatic. Right Ear: External ear normal.  
Left Ear: External ear normal.  
Nose: Nose normal.  
Mouth/Throat: Oropharynx is clear and moist.  
Eyes: Conjunctivae and EOM are normal. Pupils are equal, round, and reactive to light. No scleral icterus. Neck: Normal range of motion. Neck supple. No JVD present. No tracheal deviation present. No thyromegaly present. Cardiovascular: Normal rate, regular rhythm, normal heart sounds and intact distal pulses. Exam reveals no gallop and no friction rub. No murmur heard. Pulmonary/Chest: Effort normal and breath sounds normal. She exhibits no tenderness. Abdominal: Soft. Bowel sounds are normal. She exhibits no distension. There is no tenderness. There is no rebound and no guarding. Musculoskeletal: Normal range of motion. She exhibits no edema or tenderness. Lymphadenopathy:  
  She has no cervical adenopathy. Neurological: She is alert and oriented to person, place, and time. No cranial nerve deficit. Coordination normal.  
No sensory loss, Gait normal, Motor 5/5 Skin: Skin is warm and dry. Psychiatric: She has a normal mood and affect. Her behavior is normal. Judgment and thought content normal.  
Nursing note and vitals reviewed. Diagnostic Study Results Labs - Recent Results (from the past 12 hour(s)) EKG, 12 LEAD, INITIAL Collection Time: 01/20/19 10:51 AM  
Result Value Ref Range Ventricular Rate 89 BPM  
 Atrial Rate 89 BPM  
 P-R Interval 198 ms QRS Duration 88 ms Q-T Interval 372 ms QTC Calculation (Bezet) 452 ms Calculated P Axis 70 degrees Calculated R Axis 34 degrees Calculated T Axis 110 degrees Diagnosis Normal sinus rhythm Possible Left atrial enlargement ST & T wave abnormality, consider lateral ischemia Abnormal ECG When compared with ECG of 12-NOV-2017 08:43, 
premature ventricular complexes are no longer present Non-specific change in ST segment in Anterior leads CBC WITH AUTOMATED DIFF Collection Time: 01/20/19 10:55 AM  
Result Value Ref Range WBC 13.2 4.6 - 13.2 K/uL  
 RBC 5.34 (H) 4.20 - 5.30 M/uL  
 HGB 13.8 12.0 - 16.0 g/dL HCT 41.5 35.0 - 45.0 % MCV 77.7 74.0 - 97.0 FL  
 MCH 25.8 24.0 - 34.0 PG  
 MCHC 33.3 31.0 - 37.0 g/dL  
 RDW 14.9 (H) 11.6 - 14.5 % PLATELET 792 943 - 174 K/uL MPV 12.0 (H) 9.2 - 11.8 FL  
 NEUTROPHILS 65 40 - 73 % LYMPHOCYTES 25 21 - 52 % MONOCYTES 8 3 - 10 % EOSINOPHILS 1 0 - 5 % BASOPHILS 1 0 - 2 %  
 ABS. NEUTROPHILS 8.7 (H) 1.8 - 8.0 K/UL  
 ABS. LYMPHOCYTES 3.3 0.9 - 3.6 K/UL  
 ABS. MONOCYTES 1.0 0.05 - 1.2 K/UL  
 ABS. EOSINOPHILS 0.2 0.0 - 0.4 K/UL  
 ABS. BASOPHILS 0.1 0.0 - 0.1 K/UL  
 DF AUTOMATED METABOLIC PANEL, COMPREHENSIVE Collection Time: 01/20/19 10:55 AM  
Result Value Ref Range Sodium 136 136 - 145 mmol/L Potassium 3.9 3.5 - 5.5 mmol/L Chloride 98 (L) 100 - 108 mmol/L  
 CO2 27 21 - 32 mmol/L Anion gap 11 3.0 - 18 mmol/L Glucose 385 (H) 74 - 99 mg/dL BUN 12 7.0 - 18 MG/DL Creatinine 0.90 0.6 - 1.3 MG/DL  
 BUN/Creatinine ratio 13 12 - 20 GFR est AA >60 >60 ml/min/1.73m2 GFR est non-AA >60 >60 ml/min/1.73m2 Calcium 9.8 8.5 - 10.1 MG/DL Bilirubin, total 0.3 0.2 - 1.0 MG/DL  
 ALT (SGPT) 21 13 - 56 U/L  
 AST (SGOT) 13 (L) 15 - 37 U/L Alk. phosphatase 144 (H) 45 - 117 U/L Protein, total 8.4 (H) 6.4 - 8.2 g/dL Albumin 3.5 3.4 - 5.0 g/dL Globulin 4.9 (H) 2.0 - 4.0 g/dL A-G Ratio 0.7 (L) 0.8 - 1.7 CARDIAC PANEL,(CK, CKMB & TROPONIN) Collection Time: 01/20/19 10:55 AM  
Result Value Ref Range  26 - 192 U/L  
 CK - MB 1.3 <3.6 ng/ml CK-MB Index 0.9 0.0 - 4.0 % Troponin-I, QT <0.02 0.00 - 0.06 NG/ML  
NT-PRO BNP Collection Time: 01/20/19 10:55 AM  
Result Value Ref Range NT pro- (H) 0 - 450 PG/ML  
GLUCOSTABILIZER Collection Time: 01/20/19 12:49 PM  
Result Value Ref Range Glucose 385 mg/dL Insulin order 6.5 units/hour Insulin adminstered 6.5 units/hour Multiplier 0.020 Low target 140 mg/dL High target 180 mg/dL D50 order 0.0 ml  
 D50 administered 0.00 ml Minutes until next BG 60 min Order initials    
 Administered initials    
GLUCOSE, POC Collection Time: 01/20/19  1:52 PM  
Result Value Ref Range Glucose (POC) 251 (H) 70 - 110 mg/dL Dickie Plate Collection Time: 01/20/19  1:56 PM  
Result Value Ref Range Glucose 251 mg/dL Insulin order 1.9 units/hour Insulin adminstered 1.9 units/hour Multiplier 0.010 Low target 140 mg/dL High target 180 mg/dL D50 order 0.0 ml  
 D50 administered 0.00 ml Minutes until next BG 60 min Order initials    
 Administered initials    
 
 
Radiologic Studies -  
XR CHEST PA LAT Final Result IMPRESSION:  
  
No evidence of acute pulmonary disease. Borderline to mild cardiomegaly. CT HEAD WO CONT Final Result Impression: No CT evidence of acute intracranial pathology. Probable minimal chronic white matter microvascular ischemic changes similar to  
prior. 12:25 PM No acute changes. Interpreted by Rosalina Perea MD  
 
 
Medical Decision Making It should be noted that Adri Spear MD will be the provider of record for this patient. I reviewed the vital signs, available nursing notes, past medical history, past surgical history, family history and social history. Vital Signs-Reviewed the patient's vital signs. Pulse Oximetry Analysis -  100% on room air, normal 
 
EKG: Interpreted by the EP. Time Interpreted: 10:51 AM  
 Rate: 89 Rhythm: Normal Sinus Rhythm Interpretation: No acute changes. Comparison: No change from prior EKG on November 12, 2017. Records Reviewed: Nursing Notes and Old Medical Records (Time of Review: 12:18 PM) ED Course: Progress Notes, Reevaluation, and Consults: 
Pt feeling better after meds, discussed with her about being more compliant with her regular meds, she says she will do so. Pt is ready to go home, agrees to F/U with PCP for further eval. 
Vanesa Thacker MD 
3:07 PM 
 
 
Provider Notes (Medical Decision Making):  
 
Diagnosis Clinical Impression: No diagnosis found. Disposition:  
 
Follow-up Information None Medication List  
  
ASK your doctor about these medications   
amLODIPine 10 mg tablet Commonly known as:  NORVASC 
  
aspirin 81 mg chewable tablet 
  
carvedilol 25 mg tablet Commonly known as:  COREG 
  
DRISDOL 50,000 unit capsule Generic drug:  ergocalciferol 
  
glipiZIDE 10 mg tablet Commonly known as:  GLUCOTROL 
  
hydrALAZINE 50 mg tablet Commonly known as:  APRESOLINE 
  
LASIX 20 mg tablet Generic drug:  furosemide 
  
lisinopril 20 mg tablet Commonly known as:  PRINIVIL, ZESTRIL 
  
lovastatin 40 mg tablet Commonly known as:  MEVACOR 
  
metFORMIN 1,000 mg tablet Commonly known as:  GLUCOPHAGE 
  
naproxen 500 mg tablet Commonly known as:  NAPROSYN 
  
nitroglycerin 0.4 mg SL tablet Commonly known as:  NITROSTAT 
  
  
 
_______________________________ Scribe Attestation Jonathon Roper acting as a scribe for and in the presence of Colette Aldana MD     
January 20, 2019 at 12:18 PM 
    
Provider Attestation:     
I personally performed the services described in the documentation, reviewed the documentation, as recorded by the scribe in my presence, and it accurately and completely records my words and actions. January 20, 2019 at 12:18 PM - Letty Renner MD   
 
_______________________________

## 2019-01-20 NOTE — DISCHARGE INSTRUCTIONS

## 2019-01-20 NOTE — ED NOTES
Pt ambulatory to the bathroom with minimal assistance. Walked back to room with steady gait. Family at bedside

## 2019-01-20 NOTE — ED NOTES
Pt states she had chest pain on Wednesday. Sometimes get chest pain when she lays on her left side. Pt c/o left shoulder numbness that started today. Pt states she has not take her BP meds today

## 2019-01-21 LAB
ATRIAL RATE: 89 BPM
CALCULATED P AXIS, ECG09: 70 DEGREES
CALCULATED R AXIS, ECG10: 34 DEGREES
CALCULATED T AXIS, ECG11: 110 DEGREES
DIAGNOSIS, 93000: NORMAL
P-R INTERVAL, ECG05: 198 MS
Q-T INTERVAL, ECG07: 372 MS
QRS DURATION, ECG06: 88 MS
QTC CALCULATION (BEZET), ECG08: 452 MS
VENTRICULAR RATE, ECG03: 89 BPM

## 2020-01-30 ENCOUNTER — HOSPITAL ENCOUNTER (OUTPATIENT)
Dept: ULTRASOUND IMAGING | Age: 50
Discharge: HOME | End: 2020-01-30
Attending: INTERNAL MEDICINE
Payer: COMMERCIAL

## 2020-01-30 DIAGNOSIS — E04.2 NONTOXIC MULTINODULAR GOITER: ICD-10-CM

## 2020-01-30 PROCEDURE — 76536 US EXAM OF HEAD AND NECK: CPT

## 2020-03-04 ENCOUNTER — APPOINTMENT (OUTPATIENT)
Dept: VASCULAR SURGERY | Age: 50
End: 2020-03-04
Attending: EMERGENCY MEDICINE
Payer: COMMERCIAL

## 2020-03-04 ENCOUNTER — HOSPITAL ENCOUNTER (EMERGENCY)
Age: 50
Discharge: HOME | End: 2020-03-04
Attending: EMERGENCY MEDICINE
Payer: COMMERCIAL

## 2020-03-04 ENCOUNTER — APPOINTMENT (OUTPATIENT)
Dept: GENERAL RADIOLOGY | Age: 50
End: 2020-03-04
Attending: EMERGENCY MEDICINE
Payer: COMMERCIAL

## 2020-03-04 VITALS
OXYGEN SATURATION: 100 % | SYSTOLIC BLOOD PRESSURE: 189 MMHG | TEMPERATURE: 98.6 F | HEIGHT: 69 IN | HEART RATE: 85 BPM | WEIGHT: 245 LBS | BODY MASS INDEX: 36.29 KG/M2 | RESPIRATION RATE: 16 BRPM | DIASTOLIC BLOOD PRESSURE: 85 MMHG

## 2020-03-04 DIAGNOSIS — M25.561 ACUTE PAIN OF RIGHT KNEE: Primary | ICD-10-CM

## 2020-03-04 DIAGNOSIS — M77.9 BONE SPUR: ICD-10-CM

## 2020-03-04 DIAGNOSIS — I10 ELEVATED BLOOD PRESSURE READING WITH DIAGNOSIS OF HYPERTENSION: ICD-10-CM

## 2020-03-04 PROCEDURE — 74011250637 HC RX REV CODE- 250/637: Performed by: EMERGENCY MEDICINE

## 2020-03-04 PROCEDURE — 99283 EMERGENCY DEPT VISIT LOW MDM: CPT

## 2020-03-04 PROCEDURE — 93971 EXTREMITY STUDY: CPT

## 2020-03-04 PROCEDURE — 73562 X-RAY EXAM OF KNEE 3: CPT

## 2020-03-04 RX ORDER — IBUPROFEN 400 MG/1
800 TABLET ORAL ONCE
Status: COMPLETED | OUTPATIENT
Start: 2020-03-04 | End: 2020-03-04

## 2020-03-04 RX ORDER — METHOCARBAMOL 750 MG/1
750 TABLET, FILM COATED ORAL
Qty: 12 TAB | Refills: 0 | Status: SHIPPED | OUTPATIENT
Start: 2020-03-04 | End: 2020-03-09

## 2020-03-04 RX ADMIN — IBUPROFEN 800 MG: 400 TABLET, FILM COATED ORAL at 15:34

## 2020-03-04 NOTE — ED PROVIDER NOTES
EMERGENCY DEPARTMENT HISTORY AND PHYSICAL EXAM    2:54 PM      Date: 3/4/2020  Patient Name: Stacey Liang    History of Presenting Illness     Chief Complaint   Patient presents with    Knee Pain         History Provided By: Patient    Additional History (Context): Stacey Liang is a 47 y.o. female with Past medical history of hypertension, obesity, diabetes, diabetic neuropathy who presents with chief complaint of right knee pain for the past 2 weeks. She states that there is stiffness in her knee. The pain is moderate and is at the back of her knee as well as the front. She denies any injury to the knee. She does states that on her job she sometimes on her knees working. Certain movement of the knee makes the pain worse. Denies shortness of breath, chest pain, weakness, numbness, fever, and no other complaint. PCP: Michelle Mccray MD        Past History     Past Medical History:  Past Medical History:   Diagnosis Date    [facility]omyopathy [facility])     [facility] [address] [facility]     Diabetes mellitus ([facility]     Diabetic neuropathy ([facility]     Hypertension     Microalbuminuria     Mixed hyperlipidemia     Obesity     Ovarian cancer ([facility] [address] [facility]     Vitamin D deficiency        Past Surgical History:  Past Surgical History:   Procedure Laterality Date    VAG HYST,UTERUS >250 GMS,REM TUBE/OVARY         Family History:  History reviewed. No pertinent family history. Social History:  Social History     Tobacco Use    Smoking status: Not on file   Substance Use Topics    Alcohol use: Not on file    Drug use: Not on file       Allergies:  No Known Allergies      Review of Systems       Review of Systems   Constitutional: Negative for chills and fever. HENT: Negative for sore throat and trouble swallowing. Eyes: Negative for visual disturbance. Respiratory: Negative for cough, shortness of breath and wheezing. Cardiovascular: Negative for chest pain, palpitations and leg swelling. Gastrointestinal: Negative for abdominal pain, nausea and vomiting. Endocrine: Negative for polyuria. Genitourinary: Negative for dysuria. Musculoskeletal: Positive for arthralgias. Negative for neck pain and neck stiffness. Right knee pain, anteriorly and posterior   Skin: Negative for pallor and rash. Neurological: Negative for dizziness, weakness, numbness and headaches. Hematological: Does not bruise/bleed easily. Psychiatric/Behavioral: Negative for confusion and dysphoric mood. All other systems reviewed and are negative. Physical Exam     Visit Vitals  BP (!) 195/95 (BP 1 Location: Left arm, BP Patient Position: At rest)   Pulse 84   Temp 98.6 °F (37 °C)   Resp 18   Ht 5' 9\" (1.753 m)   Wt 111.1 kg (245 lb)   SpO2 97%   BMI 36.18 kg/m²         Physical Exam  Vitals signs and nursing note reviewed. Constitutional:       General: She is not in acute distress. Appearance: She is well-developed. She is not diaphoretic. HENT:      Head: Normocephalic and atraumatic. Eyes:      General: No scleral icterus. Conjunctiva/sclera: Conjunctivae normal.      Pupils: Pupils are equal, round, and reactive to light. Neck:      Musculoskeletal: Normal range of motion and neck supple. Cardiovascular:      Rate and Rhythm: Normal rate. Comments: Capillary refill < 3 seconds  Pulmonary:      Effort: Pulmonary effort is normal. No respiratory distress. Breath sounds: Normal breath sounds. No wheezing. Abdominal:      General: Bowel sounds are normal. There is no distension. Palpations: Abdomen is soft. Tenderness: There is no abdominal tenderness. Musculoskeletal: Normal range of motion. General: Tenderness present. No signs of injury.       Comments: Right lower extremity:  Anterior knee tenderness to palpation  Popliteal tenderness to palpation  Minimal swelling of bilateral lower extremities but symmetric  There is no compartment tightness  Has symmetric dorsalis pedal pulses  Has full range of motion bilateral lower extremities  There is no discoloration of the lower extremities  There is no laxity of the right knee   Lymphadenopathy:      Cervical: No cervical adenopathy. Skin:     General: Skin is warm and dry. Neurological:      Mental Status: She is alert and oriented to person, place, and time. Cranial Nerves: No cranial nerve deficit. Diagnostic Study Results     Labs -  No results found for this or any previous visit (from the past 12 hour(s)). Radiologic Studies -   XR KNEE RT 3 V   Final Result   IMPRESSION:   1. No displaced fracture or dislocation. 2. Mild patellar spurring as above. Medical Decision Making   I am the first provider for this patient. I reviewed the vital signs, available nursing notes, past medical history, past surgical history, family history and social history. Vital Signs-Reviewed the patient's vital signs. Records Reviewed: Nursing Notes and Old Medical Records (Time of Review: 2:54 PM)    Provider Notes (Medical Decision Making): DDX: Fracture, arthritis, DVT, Baker's cyst, musculoskeletal pain versus spasm    Give analgesia, get x-ray knee and vascular study to evaluate for DVT      MDM    Medications   ibuprofen (MOTRIN) tablet 800 mg (800 mg Oral Given 3/4/20 7894)       Vascular study:Right Lower Venous     No evidence of deep vein thrombosis in the common femoral, profunda femoral, superficial femoral, popliteal, posterior tibial, and peroneal veins. The veins were imaged in the transverse and longitudinal planes. The vessels showed normal color filling and compressibility. Doppler interrogation showed phasic and spontaneous flow. The right posterior tibial artery has triphasic waveforms. Left Lower Venous     For comparison purposes, the left common femoral vein was briefly interrogated. These vein demonstrates normal color filing and compressibility.  Doppler flow was phasic and spontaneous. ED Course: Progress Notes, Reevaluation, and Consults:  I have reassessed the patient. I have discussed the workup, results and plan with the patient and patient is in agreement. Patient is feeling better. Patient will be prescribed Robaxin. Patient was discharge in stable condition. Patient was given outpatient follow up. Patient is to return to emergency department if any new or worsening condition. We will have her follow-up PCP to get repeat blood pressure check. I discussed risk of elevated blood pressure. She is asymptomatic currently. Neuro grossly intact. States that she does take her BP medications, thus elevation in BP could be associated with pain. Also, will have her follow-up orthopedist regarding her knee. Diagnosis     Clinical Impression:   1. Acute pain of right knee    2. Elevated blood pressure reading with diagnosis of hypertension    3. Bone spur        Disposition: Discharged    Follow-up Information     Follow up With Specialties Details Why Contact Info    Nida Mascorro MD Internal Medicine Schedule an appointment as soon as possible for a visit in 3 days Get repeat blood pressure check [address]      Yo Lamar MD Orthopedic Surgery Schedule an appointment as soon as possible for a visit in 2 days  [address] [facility] [address] [facility]      [address] [facility] EMERGENCY DEPT Emergency Medicine  As needed, If symptoms worsen [address] [facility]  [phone]           Patient's Medications   Start Taking    METHOCARBAMOL (ROBAXIN-750) 750 MG TABLET    Take 1 Tab by mouth three (3) times daily as needed (Skeletal pain; muscle spasms) for up to 5 days. Continue Taking    AMLODIPINE (NORVASC) 10 MG TABLET    Take 10 mg by mouth daily. ASPIRIN 81 MG CHEWABLE TABLET    Take 81 mg by mouth daily.     CARVEDILOL (COREG) 25 MG TABLET Take 25 mg by mouth two (2) times daily (with meals). ERGOCALCIFEROL (DRISDOL) 50,000 UNIT CAPSULE    Take 50,000 Units by mouth. FUROSEMIDE (LASIX) 20 MG TABLET    Take 20 mg by mouth daily. GLIPIZIDE (GLUCOTROL) 10 MG TABLET    Take 10 mg by mouth two (2) times a day. HYDRALAZINE (APRESOLINE) 50 MG TABLET    Take 50 mg by mouth three (3) times daily. LOVASTATIN (MEVACOR) 40 MG TABLET    Take 40 mg by mouth nightly. METFORMIN (GLUCOPHAGE) 1,000 MG TABLET    Take 1,000 mg by mouth two (2) times daily (with meals). NAPROXEN (NAPROSYN) 500 MG TABLET    Take 500 mg by mouth two (2) times daily (with meals). NITROGLYCERIN (NITROSTAT) 0.4 MG SL TABLET    0.4 mg by SubLINGual route every five (5) minutes as needed for Chest Pain. These Medications have changed    No medications on file   Stop Taking    LISINOPRIL (PRINIVIL, ZESTRIL) 20 MG TABLET    Take 20 mg by mouth daily. DO Leni Acosta medical dictation software was used for portions of this report. Unintended transcription errors may occur. My signature above authenticates this document and my orders, the final    diagnosis (es), discharge prescription (s), and instructions in the Epic    record.

## 2020-03-04 NOTE — ED TRIAGE NOTES
Patient c/o right knee pain x couple weeks. She states stiffness and swelling to affected knee. Denies injury.

## 2020-03-04 NOTE — LETTER
[address] [place] [address] [facility] EMERGENCY DEPT 
[address] [facility] [id number] [phone] Work/School Note Date: 3/4/2020 To Whom It May concern: 
 
Yenni Mas was seen and treated today in the emergency room by the following provider(s): 
Attending Provider: Kaz Smith DO. Yenni Mas may return to work on 3/9/2020. Sincerely, Vi Lares RN

## 2020-03-04 NOTE — ROUTINE PROCESS
Ashtyn Sotelo is a 47 y.o. female that was discharged in stable. Pt was accompanied by friend. Pt is not driving. The patients diagnosis, condition and treatment were explained to  patient and aftercare instructions were given. The patient verbalized understanding. Patient armband removed and shredded.

## 2020-03-04 NOTE — DISCHARGE INSTRUCTIONS
Learning About High Blood Pressure  What is high blood pressure? Blood pressure is a measure of how hard the blood pushes against the walls of your arteries. It's normal for blood pressure to go up and down throughout the day, but if it stays up, you have high blood pressure. Another name for high blood pressure is hypertension. Two numbers tell you your blood pressure. The first number is the systolic pressure. It shows how hard the blood pushes when your heart is pumping. The second number is the diastolic pressure. It shows how hard the blood pushes between heartbeats, when your heart is relaxed and filling with blood. Your doctor will give you a goal for your blood pressure. Your goal will be based on your health and your age. High blood pressure (hypertension) means that the top number stays high, or the bottom number stays high, or both. High blood pressure increases the risk of stroke, heart attack, and other problems. You and your doctor will talk about your risks of these problems based on your blood pressure. What happens when you have high blood pressure? · Blood flows through your arteries with too much force. Over time, this damages the walls of your arteries. But you can't feel it. High blood pressure usually doesn't cause symptoms. · Fat and calcium start to build up in your arteries. This buildup is called plaque. Plaque makes your arteries narrower and stiffer. Blood can't flow through them as easily. · This lack of good blood flow starts to damage some of the organs in your body. This can lead to problems such as coronary artery disease and heart attack, heart failure, stroke, kidney failure, and eye damage. How can you prevent high blood pressure? · Stay at a healthy weight. · Try to limit how much sodium you eat to less than 2,300 milligrams (mg) a day. If you limit your sodium to 1,500 mg a day, you can lower your blood pressure even more.   ? Buy foods that are labeled \"unsalted,\" \"sodium-free,\" or \"low-sodium. \" Foods labeled \"reduced-sodium\" and \"light sodium\" may still have too much sodium. ? Flavor your food with garlic, lemon juice, onion, vinegar, herbs, and spices instead of salt. Do not use soy sauce, steak sauce, onion salt, garlic salt, mustard, or ketchup on your food. ? Use less salt (or none) when recipes call for it. You can often use half the salt a recipe calls for without losing flavor. · Be physically active. Get at least 30 minutes of exercise on most days of the week. Walking is a good choice. You also may want to do other activities, such as running, swimming, cycling, or playing tennis or team sports. · Limit alcohol to 2 drinks a day for men and 1 drink a day for women. · Eat plenty of fruits, vegetables, and low-fat dairy products. Eat less saturated and total fats. How is high blood pressure treated? · Your doctor will suggest making lifestyle changes to help your heart. For example, your doctor may ask you to eat healthy foods, quit smoking, lose extra weight, and be more active. · If lifestyle changes don't help enough, your doctor may recommend that you take medicine. · When blood pressure is very high, medicines are needed to lower it. Follow-up care is a key part of your treatment and safety. Be sure to make and go to all appointments, and call your doctor if you are having problems. It's also a good idea to know your test results and keep a list of the medicines you take. Where can you learn more? Go to http://aparna-rishi.info/. Enter P501 in the search box to learn more about \"Learning About High Blood Pressure. \"  Current as of: April 9, 2019  Content Version: 12.2  © 5921-4301 [employer]. Care instructions adapted under license by [employer] (which disclaims liability or warranty for this information).  If you have questions about a medical condition or this instruction, always ask your healthcare professional. [place] [address] any warranty or liability for your use of this information. Patient Education        Musculoskeletal Pain: Care Instructions  Your Care Instructions    Different problems with the bones, muscles, nerves, ligaments, and tendons in the body can cause pain. One or more areas of your body may ache or burn. Or they may feel tired, stiff, or sore. The medical term for this type of pain is musculoskeletal pain. It can have many different causes. Sometimes the pain is caused by an injury such as a strain or sprain. Or you might have pain from using one part of your body in the same way over and over again. This is called overuse. In some cases, the cause of the pain is another health problem such as arthritis or fibromyalgia. The doctor will examine you and ask you questions about your health to help find the cause of your pain. Blood tests or imaging tests like an X-ray may also be helpful. But sometimes doctors can't find a cause of the pain. Treatment depends on your symptoms and the cause of the pain, if known. The doctor has checked you carefully, but problems can develop later. If you notice any problems or new symptoms, get medical treatment right away. Follow-up care is a key part of your treatment and safety. Be sure to make and go to all appointments, and call your doctor if you are having problems. It's also a good idea to know your test results and keep a list of the medicines you take. How can you care for yourself at home? · Rest until you feel better. · Do not do anything that makes the pain worse. Return to exercise gradually if you feel better and your doctor says it's okay. · Be safe with medicines. Read and follow all instructions on the label. ? If the doctor gave you a prescription medicine for pain, take it as prescribed.   ? If you are not taking a prescription pain medicine, ask your doctor if you can take an over-the-counter medicine. · Put ice or a cold pack on the area for 10 to 20 minutes at a time to ease pain. Put a thin cloth between the ice and your skin. When should you call for help? Call your doctor now or seek immediate medical care if:    · You have new pain, or your pain gets worse.     · You have new symptoms such as a fever, a rash, or chills.    Watch closely for changes in your health, and be sure to contact your doctor if:    · You do not get better as expected. Where can you learn more? Go to http://aparna-rishi.info/. Enter G801 in the search box to learn more about \"Musculoskeletal Pain: Care Instructions. \"  Current as of: March 28, 2019  Content Version: 12.2  © 4062-6208 [employer]. Care instructions adapted under license by [employer] (which disclaims liability or warranty for this information). If you have questions about a medical condition or this instruction, always ask your healthcare professional. [place] [address] any warranty or liability for your use of this information.

## 2020-12-21 ENCOUNTER — TRANSCRIBE ORDER (OUTPATIENT)
Dept: SCHEDULING | Age: 50
End: 2020-12-21

## 2020-12-21 DIAGNOSIS — R60.0 PEDAL EDEMA: Primary | ICD-10-CM

## 2021-01-15 ENCOUNTER — HOSPITAL ENCOUNTER (OUTPATIENT)
Dept: VASCULAR SURGERY | Age: 51
Discharge: HOME | End: 2021-01-15
Attending: INTERNAL MEDICINE
Payer: COMMERCIAL

## 2021-01-15 DIAGNOSIS — R60.0 PEDAL EDEMA: ICD-10-CM

## 2021-01-15 PROCEDURE — 93970 EXTREMITY STUDY: CPT

## 2021-12-09 NOTE — ED NOTES
Hospice Discharge Summary    [facility]  Good Help to Those in Need        Date of Admission: 12/6/2021  Date of Discharge: 12/9/21    Norva Castleman is a 49y.o. year old who was admitted to [facility] at [facility] with a Hospice diagnosis of Breast Cancer. The patient was discharged to home for ongoing Hospice care. Had a good visit with patient who is ready to go home. Patient is a [facility] [place] patient. Made multiple adjustments in medication.   This will be the following list of medications    Soma 350 mg 3 times a day  Decadron 4 mg twice daily  Bentyl 20 mg twice a day  Gabapentin has been increased to 600 mg 3 times a day  Keppra 500 mg 3 times a day  Ativan 1 mg every 3 hours  Toprol-XL 50 mg daily  Flagyl 500 mg for 7 days topically  New medication-MS Contin 60 mg 3 times a day-methadone has been stopped  Dilantin extended release 100 mg 3 times a day  Seroquel 300 mg at bedtime   Aldactone 25 mg daily  Her as needed dose of Dilaudid has been changed to 16 mg every 4 hours as needed.-She required 2 doses in the last 24 hours  She has as needed doses of Colace, Haldol, Levsin, Ativan, Phenergan, trazodone, senna-we have not changed any of these doses    Plan reviewed with bedside nursing team who will talk with hospice or continue I have reviewed discharge instructions with the spouse. The spouse verbalized understanding. Patient armband removed and shredded

## 2022-10-26 ENCOUNTER — TRANSCRIBE ORDER (OUTPATIENT)
Dept: SCHEDULING | Age: 52
End: 2022-10-26

## 2022-10-26 DIAGNOSIS — H53.8 VISION BLURRED: Primary | ICD-10-CM

## 2022-10-27 ENCOUNTER — TRANSCRIBE ORDER (OUTPATIENT)
Dept: SCHEDULING | Age: 52
End: 2022-10-27

## 2022-10-27 DIAGNOSIS — H53.8 VISION BLURRING: Primary | ICD-10-CM

## 2022-12-05 ENCOUNTER — HOSPITAL ENCOUNTER (OUTPATIENT)
Dept: LAB | Age: 52
Discharge: HOME | End: 2022-12-05

## 2022-12-05 LAB — XX-LABCORP SPECIMEN COL,LCBCF: NORMAL

## 2022-12-05 PROCEDURE — 99001 SPECIMEN HANDLING PT-LAB: CPT

## 2023-01-12 ENCOUNTER — DOCUMENTATION ONLY (OUTPATIENT)
Dept: NEPHROLOGY | Age: 53
End: 2023-01-12

## 2023-01-13 NOTE — PROGRESS NOTES
[facility]  Appointment: 2022 11:30 AM  Location: [facility] Office  Patient #: [id number]  : 1970   / Language: English / Race: Black or   Female      History of Present Illness Papito Andrade MD; 2022 4:23 AM)  The patient is a 47year old female who presents with chronic kidney disease. This is classified as stage 4. 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 She was admitted for NSTEMI, Takotsubo's cardiomyopathy, acute on chronic systolic and diastolic CHF, baseline creatinine in the 2 range, creatinine in the hospital was as high as 3.1. Patient had a renal ultrasound showed bilateral echogenic kidneys, renal duplex showed no evidence for hemodynamically significant stenosis in either renal arteries. Patient was also noted to have uncontrolled hypertension blood pressure appears to be much better controlled now.   Follow-up in 4 months time with CKD    Problem List/Past Medical (Martin Lira; 2022 11:26 AM)  History of ovarian cancer (Z85.43)    DM type 2 (diabetes mellitus, type 2) (E11.9)    HLD (hyperlipidemia) (E78.5)    HTN (hypertension) (I10)    Elevated troponin (R77.8)    Abnormal result of cardiovascular function study suggestive of non-ST elevation myocardial infarction (NSTEMI) (R94.30)    Microalbuminuria (R80.9)    SANJIV (acute kidney injury) (N17.9)    Candida cystitis (B37.41)    Vitamin D deficiency (E55.9)    CHF (congestive heart failure) (I50.9)    Chronic kidney disease, stage 4 (severe) (N18.4)    Proteinuria (R80.9)    Problems Reconciled      Allergies (Lyubov Rice; 12/5/2022 11:26 AM)  No Known Drug Allergies   [12/02/2022]: Allergies Reconciled      Social History (Taylor Victoria; 12/2/2022 11:36 AM)  Non Drinker/No Alcohol Use    No Drug Use    Tobacco use   Former smoker. Medication History (Lyubov Rice; 89/1/4776 64:16 AM)  GARLIC PO  (1 Daily) Active. hydrALAZINE HCl  (100MG Tablet, 1 Oral Two times daily) Active. Furosemide  (20MG Tablet, 1 Oral Daily) Active. Dulaglutide  (3MG/0.5ML Soln Pen-inj, 3 MG Subcutaneous EVERY MONDAY) Active. Ergocalciferol  (1.25 MG(41256 UT) Capsule, 1 Oral EVERY MONDAY) Active. Insulin Glargine  (100UNIT/ML Solution, 23 UNIT Subcutaneous PER SLIDING BS SCALE TWICE DAILY) Active. One-Daily Multi Vitamins  (1 Oral Daily) Active. Nitroglycerin  (0.4MG Tab Sublingual, 1 Sublingual EVERY 5 MINUTES AS NEEDED FOR CHEST PAIN) Active. Isosorbide Dinitrate  (20MG Tablet, 1 Oral THree times daily) Active. Atorvastatin Calcium  (40MG Tablet, 1 Oral at bedtime) Active. amLODIPine Besylate  (10MG Tablet, 1/2 tablet Oral daily) Active. Aspirin  (81MG Tablet, 1 Oral daily) Active. cloNIDine HCl  (0.3MG Tablet, 1 Oral three times daily) Active. Carvedilol  (25MG Tablet, 1 Oral two times daily) Active. CINNAMON BARK  (1 Daily) Active. Sertraline HCl  (50MG Tablet, 1 Oral daily) Active. Spironolactone  (50MG Tablet, 1 Oral Daily) Active. Medications Reconciled     Past Surgical History Taylor Victoria; 12/2/2022 11:30 AM)  Hysterectomy; Total   [03/1993]: ovarian cancer    Health Maintenance History (Taylor Victoria; 12/2/2022 11:36 AM)  Flu Vaccine   [11/22/2022]:  Pneumovax   [02/28/2015]:  Colonoscopy, Screening   Refused. Mammogram, Screening   [03/28/2021]:        Review of Systems Henry Collins MD; 12/6/2022 4:23 AM)  General Not Present- Anorexia, Chills, Fatigue and Fever.   Skin Not Present- Bruising, Pruritus, Rash and Ulcer. HEENT Not Present- Dry Mucous Membranes, Dysgeusia, Oral Ulcers, Periorbital Puffiness and Sore Throat. Respiratory Not Present- Cough, Difficulty Breathing on Exertion, Dyspnea and Hemoptysis. Cardiovascular Not Present- Chest Pain, Claudications, Orthopnea, Palpitations, Paroxysmal Nocturnal Dyspnea and Swelling of Extremities. Gastrointestinal Not Present- Abdominal Pain, Abdominal Swelling, Constipation, Diarrhea, Hematochezia, Melena, Nausea and Vomiting. Female Genitourinary Not Present- Blood in Urine, Difficulty Emptying Bladder, Dysuria, Frequency, Hematuria and Nocturia. Musculoskeletal Not Present- Joint Pain, Joint Redness, Joint Stiffness, Joint Swelling, Leg Cramps and Myalgia. Neurological Not Present- Dizziness, Headaches, Syncope and Trouble walking. Endocrine Not Present- Appetite Changes, Excessive Thirst, Polydipsia and Polyuria. Hematology Not Present- Easy Bruising and Excessive bleeding. Vitals (Kavita Roth; 12/5/2022 11:47 AM)  12/5/2022 11:46 AM  Weight: 235 lb   Height: 67 in   Body Surface Area: 2.17 m²   Body Mass Index: 36.81 kg/m²    Pain Level: 0/10    Temp.: 97.3° F    Pulse: 77 (Regular)    Resp.: 12 (Unlabored)    P. OX: 100% (Room air)  BP: 127/72(Sitting, Left Arm, Large)              Physical Exam Sanaz Porter MD; 12/6/2022 4:24 AM)  General  Mental Status - Alert. General Appearance - Not in acute distress. Orientation - Oriented to time, Oriented to place and Oriented to person. Build & Nutrition - Well nourished and Well developed. Head and Neck  Head - normocephalic, atraumatic with no lesions or palpable masses. Neck  Global Assessment - supple, no bruit auscultated on the right, no bruit auscultated on the left, no lymphadenopathy. Thyroid  Gland Characteristics - normal size and consistency. Eye  Fundi - Left - Normal.  Fundi - Right - Normal.  Pupil - Left - Accommodating.   Pupil - Right - Accommodating. ENMT  Mouth and Throat  Nasopharynx - no airway distress observed. Chest and Lung Exam  Auscultation  Breath sounds - Clear. Cardiovascular  Auscultation  Rhythm - Regular. Heart Sounds - Normal heart sounds. Murmurs & Other Heart Sounds - Auscultation of the heart reveals - No Murmurs and No Pericardial Friction Rubs. Abdomen  Palpation/Percussion  Palpation and Percussion of the abdomen reveal - Soft, Non Tender, No hepatosplenomegaly and No Palpable abdominal masses. Auscultation  Auscultation of the abdomen reveals - Bowel sounds normal and No Abdominal bruits. Female Genitourinary  External Genitalia - Did not examine. Peripheral Vascular  Lower Extremity  Palpation - Pulses bilaterally normal.    Neurologic  Neurologic evaluation reveals  - normal attention span and ability to concentrate and normal coordination. Assessment & Plan Sanaz Porter MD; 12/6/2022 4:25 AM)    Chronic kidney disease, stage 4 (severe) (N18.4) <HXF486>  Impression: #1 Chronic kidney disease stage IV, etiology: Diabetic nephropathy and hypertension nephrosclerosis. Baseline creatinine in the 2 range, patient does have significant proteinuria which needs to be quantified. Renal ultrasound and renal duplex as noted above were negative for any obstruction or renal artery stenosis  #2 diabetes mellitus type 2 with complications need better control. Goal hemoglobin A1c of 7 or below  #3 hypertension appears to be better controlled, continue present medications.   #4 chronic systolic and diastolic CHF, on Lasix, volume status ok  #5 Takotsubo cardiomyopathy  #6 history of NSTEMI    Plan:  #1 monitor blood pressures at home, goal less than 130/80  #2 Daily weights  #3 continue Lasix 20 mg daily  #4 continue present blood pressure medications including Aldactone  #5 quantify proteinuria protein creatinine ratio and albumin creatinine ratio,  #6 check SPEP, serum free light chains  #7 check PTH and vitamin D  #8 obtain recent BMP and CBC from Dr. Stephanie Lipscomb office  #9 discussed with patient avoiding NSAIDs and other nephrotoxins  #10 needs close follow-up with cardiology    f/u 4 mths with CKD labs    Current Plans  NEPHELOMETRY, EACH ANALYTE NEC (74446)  PROTEIN ELECTR-FRAC/IRAIDA (50829)  PTH INTACT (02755)  RENAL FUNCTION PANEL (83377)    SANJIV (acute kidney injury) (N17.9) <HYJ105>      HTN (hypertension) (I10)    Current Plans  CBC (75056)    DM type 2 (diabetes mellitus, type 2) (E11.9) <HCC19>    Current Plans  SPOT PROTEIN URINE (86881)  SPOT URINE CREATININE(80114)  URINALYSIS MICROSCOPY (87232)    Microalbuminuria (R80.9)    Current Plans  MICROALBUMIN / CREATININE RATIO (17800)    Vitamin D deficiency (E55.9)    Current Plans  VITAMIN D 25 LEVEL (28614)    Portal Access Education (Z71.9)    Current Plans  Pt Education - How to Access Health Information Online using Patient Portal and 3rd Party Apps: discussed with patient and provided information.     Obesity with body mass index 30 or greater (E66.9)    Current Plans  LIFESTYLE EDUCATION REGARDING DIET (56353)  Pt Education - Healthy Diet: Brief Version *: healthy diet  Signed by Altagracia Mansfield MD (12/6/2022 4:25 AM)

## 2023-09-11 ENCOUNTER — TELEPHONE (OUTPATIENT)
Facility: HOSPITAL | Age: 53
End: 2023-09-11

## 2023-10-05 ENCOUNTER — TELEPHONE (OUTPATIENT)
Facility: HOSPITAL | Age: 53
End: 2023-10-05

## 2023-10-05 NOTE — TELEPHONE ENCOUNTER
R/S TO 10/16/23. Patient cancelled appt. on 10/5/23 at 12:26pm due to the patient being sick today and she is not able to make her appt.

## 2024-01-02 ENCOUNTER — HOSPITAL ENCOUNTER (OUTPATIENT)
Facility: HOSPITAL | Age: 54
Discharge: HOME | End: 2024-01-05
Payer: COMMERCIAL

## 2024-01-02 DIAGNOSIS — I10 HYPERTENSION, UNSPECIFIED TYPE: ICD-10-CM

## 2024-01-02 DIAGNOSIS — N18.4 CHRONIC RENAL DISEASE, STAGE IV (HCC): ICD-10-CM

## 2024-01-02 DIAGNOSIS — E55.9 VITAMIN D DEFICIENCY: ICD-10-CM

## 2024-01-02 DIAGNOSIS — R80.9 MICROALBUMINURIA: ICD-10-CM

## 2024-01-02 LAB
25(OH)D3 SERPL-MCNC: 89.8 NG/ML (ref 30–100)
CALCIUM SERPL-MCNC: 9.2 MG/DL (ref 8.5–10.1)
CREAT UR-MCNC: 131 MG/DL (ref 30–125)
ERYTHROCYTE [DISTWIDTH] IN BLOOD BY AUTOMATED COUNT: 15.3 % (ref 11.6–14.5)
HCT VFR BLD AUTO: 30.7 % (ref 35–45)
HGB BLD-MCNC: 9.8 G/DL (ref 12–16)
MCH RBC QN AUTO: 26 PG (ref 24–34)
MCHC RBC AUTO-ENTMCNC: 31.9 G/DL (ref 31–37)
MCV RBC AUTO: 81.4 FL (ref 78–100)
MICROALBUMIN UR-MCNC: 52.2 MG/DL (ref 0–3)
MICROALBUMIN/CREAT UR-RTO: 398 MG/G (ref 0–30)
NRBC # BLD: 0 K/UL (ref 0–0.01)
NRBC BLD-RTO: 0 PER 100 WBC
PLATELET # BLD AUTO: 398 K/UL (ref 135–420)
PMV BLD AUTO: 10.4 FL (ref 9.2–11.8)
PTH-INTACT SERPL-MCNC: 122.9 PG/ML (ref 18.4–88)
RBC # BLD AUTO: 3.77 M/UL (ref 4.2–5.3)
WBC # BLD AUTO: 11.7 K/UL (ref 4.6–13.2)

## 2024-01-02 PROCEDURE — 83970 ASSAY OF PARATHORMONE: CPT

## 2024-01-02 PROCEDURE — 85027 COMPLETE CBC AUTOMATED: CPT

## 2024-01-02 PROCEDURE — 82306 VITAMIN D 25 HYDROXY: CPT

## 2024-01-02 PROCEDURE — 82043 UR ALBUMIN QUANTITATIVE: CPT

## 2024-01-02 PROCEDURE — 82570 ASSAY OF URINE CREATININE: CPT

## 2024-01-02 PROCEDURE — 84156 ASSAY OF PROTEIN URINE: CPT

## 2024-01-02 PROCEDURE — 36415 COLL VENOUS BLD VENIPUNCTURE: CPT

## 2024-01-03 LAB
CREAT UR-MCNC: 130 MG/DL (ref 30–125)
PROT UR-MCNC: 81 MG/DL
PROT/CREAT UR-RTO: 0.6

## 2024-01-17 ENCOUNTER — TELEPHONE (OUTPATIENT)
Age: 54
End: 2024-01-17

## 2024-01-17 NOTE — TELEPHONE ENCOUNTER
Received referral from Dr. Juan March for evaluation for Arteriovenous Fistula creation. Provider has vein mapping ordered. Called and left message to get patient in for vein mapping then see Dr. Gee next available. Waiting for patient to call back.

## 2024-02-15 ENCOUNTER — OFFICE VISIT (OUTPATIENT)
Age: 54
End: 2024-02-15
Payer: COMMERCIAL

## 2024-02-15 VITALS
SYSTOLIC BLOOD PRESSURE: 130 MMHG | DIASTOLIC BLOOD PRESSURE: 70 MMHG | BODY MASS INDEX: 34.66 KG/M2 | HEIGHT: 69 IN | OXYGEN SATURATION: 99 % | HEART RATE: 66 BPM | WEIGHT: 234 LBS

## 2024-02-15 DIAGNOSIS — N18.6 ESRD (END STAGE RENAL DISEASE) (HCC): Primary | ICD-10-CM

## 2024-02-15 PROCEDURE — 99203 OFFICE O/P NEW LOW 30 MIN: CPT | Performed by: SURGERY

## 2024-02-15 RX ORDER — CALCITRIOL 0.25 UG/1
0.25 CAPSULE, LIQUID FILLED ORAL DAILY
COMMUNITY

## 2024-02-15 RX ORDER — ISOSORBIDE MONONITRATE 120 MG/1
120 TABLET, EXTENDED RELEASE ORAL DAILY
COMMUNITY
Start: 2023-11-11

## 2024-02-15 RX ORDER — HYDRALAZINE HYDROCHLORIDE 100 MG/1
TABLET, FILM COATED ORAL
COMMUNITY

## 2024-02-15 RX ORDER — ATORVASTATIN CALCIUM 20 MG/1
TABLET, FILM COATED ORAL
COMMUNITY

## 2024-02-15 RX ORDER — ASPIRIN 81 MG/1
81 TABLET, CHEWABLE ORAL DAILY
COMMUNITY

## 2024-02-15 RX ORDER — ROSUVASTATIN CALCIUM 20 MG/1
TABLET, COATED ORAL
COMMUNITY

## 2024-02-15 RX ORDER — CLONIDINE HYDROCHLORIDE 0.3 MG/1
TABLET ORAL
COMMUNITY

## 2024-02-15 RX ORDER — INSULIN GLARGINE 100 [IU]/ML
INJECTION, SOLUTION SUBCUTANEOUS
COMMUNITY
Start: 2023-07-22

## 2024-02-15 RX ORDER — FUROSEMIDE 40 MG/1
TABLET ORAL
COMMUNITY

## 2024-02-15 RX ORDER — SPIRONOLACTONE 50 MG/1
TABLET, FILM COATED ORAL
COMMUNITY

## 2024-02-15 RX ORDER — GLIPIZIDE 5 MG/1
TABLET ORAL
COMMUNITY

## 2024-02-15 RX ORDER — CARVEDILOL 25 MG/1
TABLET ORAL
COMMUNITY

## 2024-02-15 NOTE — PROGRESS NOTES
adequate diameter.   The left cephalic vein in the upper arm from mid to antecubital fossa segments is very small in caliber and difficult to visualize.     The left basilic vein is between 2-3 cm deep at all segments.     Right Upper Extremity Venous Measurements     Diam   Axillary Vein 11.9 mm          Cephalic Prox (Upper) 1.5 mm          Cephalic Mid (Upper) 3.7 mm          Cephalic Dist (Upper) 4.3 mm          Basilic Prox (Upper) 4.3 mm          Basilic Mid (Upper) 5 mm          Basilic Dist (Upper) 4.3 mm          Basilic [place] 2.7 mm          Antecubital 1.9 mm          Cephalic Prox (Forearm) 0.9 mm          Cephalic Mid (Forearm) 1.1 mm          Cephalic Dist (Forearm) 1.6 mm          Cephalic Wrist 1.2 mm            Left Upper Extremity Venous Measurements     Diam   Axillary Vein 12.5 mm          Cephalic Prox (Upper) 1.4 mm          Cephalic Mid (Upper) 3.7 mm          Cephalic Dist (Upper) 4.6 mm          Basilic Prox (Upper) 5 mm          Basilic Mid (Upper) 5.4 mm          Basilic Dist (Upper) 5.1 mm          Antecubital 0.9 mm          Cephalic Prox (Forearm) 2 mm          Cephalic Mid (Forearm) 2.1 mm          Cephalic Dist (Forearm) 3.4 mm          Cephalic Wrist 1.6 mm            Upper Arterial Measurements     Right PSV Right AP Left PSV Left AP   Brach Dist 92 cm/s        0.55 cm        87.8 cm/s        0.53 cm          Radial Dist 95.6 cm/s        0.3 cm        87.6 cm/s        0.28 cm                Past Medical History:   Diagnosis Date    Cardiomyopathy (HCC)     CHF (congestive heart failure) (HCC)     Diabetes mellitus (HCC)     Diabetic neuropathy (HCC)     Hypertension     Microalbuminuria     Mixed hyperlipidemia     Obesity     Ovarian cancer (HCC)     Vitamin D deficiency      Past Surgical History:   Procedure Laterality Date    VAG HYST,UTERUS >250 GMS,REM TUBE/OVARY       There is no problem list on file for this patient.    Current Outpatient Medications   Medication Sig

## 2024-02-23 ENCOUNTER — OFFICE VISIT (OUTPATIENT)
Age: 54
End: 2024-02-23
Payer: COMMERCIAL

## 2024-02-23 VITALS
HEART RATE: 79 BPM | OXYGEN SATURATION: 99 % | SYSTOLIC BLOOD PRESSURE: 150 MMHG | DIASTOLIC BLOOD PRESSURE: 76 MMHG | BODY MASS INDEX: 37.21 KG/M2 | WEIGHT: 252 LBS

## 2024-02-23 DIAGNOSIS — R07.9 CHEST PAIN, UNSPECIFIED TYPE: Primary | ICD-10-CM

## 2024-02-23 PROCEDURE — 99205 OFFICE O/P NEW HI 60 MIN: CPT | Performed by: INTERNAL MEDICINE

## 2024-02-23 NOTE — PATIENT INSTRUCTIONS
Heart Catheterization Instructions    Patient’s Name:  Debora Ballard    You are scheduled to have a left heart catheterization on 03/20/2024  at cath lab to schedule time. Please check in at n/a  .     Please go to [facility] and park in the outpatient parking lot that is located around to the back of the hospital and enter through the [employer] building.  Once you enter through the [email or web address] check in with the  there. The  will either give you directions or assist you in getting to the cath holding area.          You are not to eat or drink anything after midnight the night before your procedure. Small sips of water to take your medications is ok.     If you are diabetic, do not take your insulin/sugar pill the morning of the procedure.    MEDICATION INSTRUCTIONS:   Please take your morning medications with the following special instructions:    [x]          Please make sure to take your Blood pressure medication : spironolactone, lasix, imdur, hydralazine, clonidine, coreg.    [x]          Take your Aspirin.      We encourage families to wait in the waiting room on the first floor while the procedure is being done.  The Doctor will come out and talk with you as soon as the procedure is over.    There is the possibility that you may spend the night in the hospital, depending on the results of the procedure.  This will be determined after the procedure is done.  If angioplasty or stent is planned, you will stay at least one day.    If you or your family have any questions, please call our office Monday -Friday, 9:00 a.m.-4:30 p.m.,  At [phone], and ask to speak to one of the nurses.

## 2024-03-19 ENCOUNTER — TELEPHONE (OUTPATIENT)
Age: 54
End: 2024-03-19

## 2024-03-19 NOTE — TELEPHONE ENCOUNTER
Per message from Cath Lab. Patient called and cancel procedure and will call office to reschedule for a later date. Procedure and f/up CXD in system.

## 2024-03-22 ENCOUNTER — OFFICE VISIT (OUTPATIENT)
Age: 54
End: 2024-03-22
Payer: COMMERCIAL

## 2024-03-22 VITALS
WEIGHT: 257.6 LBS | HEIGHT: 69 IN | RESPIRATION RATE: 16 BRPM | OXYGEN SATURATION: 100 % | SYSTOLIC BLOOD PRESSURE: 130 MMHG | DIASTOLIC BLOOD PRESSURE: 62 MMHG | BODY MASS INDEX: 38.16 KG/M2 | TEMPERATURE: 97.9 F | HEART RATE: 64 BPM

## 2024-03-22 DIAGNOSIS — R01.1 SYSTOLIC MURMUR: ICD-10-CM

## 2024-03-22 DIAGNOSIS — N18.5 STAGE 5 CHRONIC KIDNEY DISEASE NOT ON CHRONIC DIALYSIS (HCC): ICD-10-CM

## 2024-03-22 DIAGNOSIS — E11.9 TYPE 2 DIABETES MELLITUS WITHOUT COMPLICATION, WITHOUT LONG-TERM CURRENT USE OF INSULIN (HCC): ICD-10-CM

## 2024-03-22 DIAGNOSIS — I51.89 DIASTOLIC DYSFUNCTION: ICD-10-CM

## 2024-03-22 DIAGNOSIS — R93.1 ABNORMAL NUCLEAR CARDIAC IMAGING TEST: ICD-10-CM

## 2024-03-22 DIAGNOSIS — R07.89 OTHER CHEST PAIN: ICD-10-CM

## 2024-03-22 DIAGNOSIS — E78.00 HYPERCHOLESTEREMIA: ICD-10-CM

## 2024-03-22 DIAGNOSIS — I10 PRIMARY HYPERTENSION: ICD-10-CM

## 2024-03-22 DIAGNOSIS — E66.01 CLASS 2 SEVERE OBESITY DUE TO EXCESS CALORIES WITH SERIOUS COMORBIDITY AND BODY MASS INDEX (BMI) OF 38.0 TO 38.9 IN ADULT (HCC): ICD-10-CM

## 2024-03-22 DIAGNOSIS — Z01.810 PREOP CARDIOVASCULAR EXAM: Primary | ICD-10-CM

## 2024-03-22 PROCEDURE — 3075F SYST BP GE 130 - 139MM HG: CPT | Performed by: INTERNAL MEDICINE

## 2024-03-22 PROCEDURE — 99215 OFFICE O/P EST HI 40 MIN: CPT | Performed by: INTERNAL MEDICINE

## 2024-03-22 PROCEDURE — 3078F DIAST BP <80 MM HG: CPT | Performed by: INTERNAL MEDICINE

## 2024-03-22 RX ORDER — ONDANSETRON 4 MG/1
4 TABLET, ORALLY DISINTEGRATING ORAL EVERY 6 HOURS PRN
COMMUNITY

## 2024-03-22 RX ORDER — NITROFURANTOIN 25; 75 MG/1; MG/1
CAPSULE ORAL
COMMUNITY
Start: 2024-03-12

## 2024-03-22 RX ORDER — FELODIPINE 10 MG/1
10 TABLET, EXTENDED RELEASE ORAL DAILY
COMMUNITY
Start: 2024-03-03

## 2024-03-22 RX ORDER — FUROSEMIDE 20 MG/1
20 TABLET ORAL NIGHTLY
COMMUNITY
Start: 2024-03-03

## 2024-03-22 RX ORDER — INSULIN LISPRO 100 [IU]/ML
INJECTION, SOLUTION INTRAVENOUS; SUBCUTANEOUS
COMMUNITY
Start: 2024-03-03

## 2024-03-22 RX ORDER — ERGOCALCIFEROL 1.25 MG/1
50000 CAPSULE ORAL WEEKLY
COMMUNITY
Start: 2024-02-22

## 2024-03-22 RX ORDER — MINOXIDIL 2.5 MG/1
5 TABLET ORAL DAILY
COMMUNITY
Start: 2024-03-07

## 2024-03-22 NOTE — PROGRESS NOTES
1. \"Have you been to the ER, urgent care clinic since your last visit?  Hospitalized since your last visit?\" Reviewed by Dr. Lorenzo Cortez    2. \"Have you seen or consulted any other health care providers outside of the  since your last visit?\" Reviewed by Dr. Lorenzo Cortez    Eye Doctor for (Cataract procedure) Dr. Godwin Russo Address: [address] [place] [id number]  Phone: [phone]     Nephrologist (Dialysis) Dr. Martinez Address: [address] [place] [address]  Phone: [phone]

## 2024-03-22 NOTE — PROGRESS NOTES
Debora Ballard (:  1970) is a 53 y.o. female,Established patient, here for evaluation of the following chief complaint(s):  Follow-up (Hospital F/U [facility])    Subjective   SUBJECTIVE/OBJECTIVE:  HPI  Patient is here today for cardiac evaluation.  She has a history of multiple medical conditions to include hypertension, diabetes and hypercholesterolemia.  Patient is end-stage renal disease being prepared for hemodialysis in the near future.  Patient had a stress test performed in 2023 which showed a reversible defect but with questionable symptoms associated.  She is able to ambulate without restriction.  She does have left neck and shoulder and left-sided chest and upper extremity discomfort at times but is clearly atypical and not characteristic of a cardiac condition.  No other acute issues.  Minimal to no shortness of breath.  No palpitations.  No orthopnea.  She is here for preoperative clearance.  She was hospitalized recently with atypical symptoms not consistent with coronary disease but more with a urinary tract infection that was treated adequately.  I have carefully reviewed all available medical records, previous office notes, lab, x-ray and procedure reports    Past Medical History:   Diagnosis Date    CHF (congestive heart failure) (HCC)     Diabetes mellitus (HCC)     Diabetic neuropathy (HCC)     Hypertension     MI (myocardial infarction) (HCC)     Microalbuminuria     Mixed hyperlipidemia     Obesity     Ovarian cancer (HCC)     Sleep apnea treated with continuous positive airway pressure (CPAP)     Vitamin D deficiency         Past Surgical History:   Procedure Laterality Date    VAG HYST,UTERUS >250 GMS,REM TUBE/OVARY          No Known Allergies     Current Outpatient Medications   Medication Sig Dispense Refill    furosemide (LASIX) 20 MG tablet Take 1 tablet by mouth at bedtime

## 2024-03-23 ASSESSMENT — ENCOUNTER SYMPTOMS
SHORTNESS OF BREATH: 0
GASTROINTESTINAL NEGATIVE: 1
EYES NEGATIVE: 1
ALLERGIC/IMMUNOLOGIC NEGATIVE: 1

## 2024-05-22 ENCOUNTER — HOSPITAL ENCOUNTER (OUTPATIENT)
Facility: HOSPITAL | Age: 54
Discharge: HOME | End: 2024-05-25
Attending: FAMILY MEDICINE
Payer: COMMERCIAL

## 2024-05-22 VITALS — WEIGHT: 255 LBS | HEIGHT: 69 IN | BODY MASS INDEX: 37.77 KG/M2

## 2024-05-22 DIAGNOSIS — Z12.31 SCREENING MAMMOGRAM FOR HIGH-RISK PATIENT: ICD-10-CM

## 2024-05-22 PROCEDURE — 77063 BREAST TOMOSYNTHESIS BI: CPT

## 2024-07-11 PROBLEM — I50.33 ACUTE ON CHRONIC HEART FAILURE WITH PRESERVED EJECTION FRACTION (HFPEF) (HCC): Status: ACTIVE | Noted: 2024-06-10

## 2024-07-11 PROBLEM — D64.89 OTHER SPECIFIED ANEMIAS: Status: ACTIVE | Noted: 2024-07-11

## 2024-07-11 PROBLEM — E11.10 DIABETIC KETOACIDOSIS WITHOUT COMA ASSOCIATED WITH TYPE 2 DIABETES MELLITUS (HCC): Status: ACTIVE | Noted: 2024-03-03

## 2024-07-11 PROBLEM — Z91.199 NONCOMPLIANCE: Status: ACTIVE | Noted: 2023-12-17

## 2024-07-11 PROBLEM — N17.9 AKI (ACUTE KIDNEY INJURY) (HCC): Status: ACTIVE | Noted: 2024-03-03

## 2024-07-11 PROBLEM — H26.9 CATARACT OF RIGHT EYE: Status: ACTIVE | Noted: 2024-03-13

## 2024-07-11 PROBLEM — D50.8 OTHER IRON DEFICIENCY ANEMIAS: Status: ACTIVE | Noted: 2024-07-11

## 2024-07-11 PROBLEM — E78.5 DYSLIPIDEMIA: Status: ACTIVE | Noted: 2023-12-17

## 2024-07-11 PROBLEM — E87.5 HYPERKALEMIA: Status: ACTIVE | Noted: 2024-03-03

## 2024-07-11 PROBLEM — G47.33 OBSTRUCTIVE SLEEP APNEA SYNDROME: Status: ACTIVE | Noted: 2022-01-14

## 2024-07-11 PROBLEM — E11.9 TYPE 2 DIABETES MELLITUS (HCC): Status: ACTIVE | Noted: 2022-01-14

## 2024-07-11 PROBLEM — Z79.4 INSULIN DOSE CHANGED (HCC): Status: ACTIVE | Noted: 2024-06-13

## 2024-07-11 PROBLEM — N18.9 CHRONIC KIDNEY DISEASE: Status: ACTIVE | Noted: 2024-07-11

## 2024-07-11 PROBLEM — J18.9 PNEUMONIA: Status: ACTIVE | Noted: 2024-06-09

## 2024-07-11 PROBLEM — Z01.818 PRE-TRANSPLANT EVALUATION FOR CHRONIC KIDNEY DISEASE: Status: ACTIVE | Noted: 2024-03-13

## 2024-07-11 PROBLEM — N18.5 CKD (CHRONIC KIDNEY DISEASE) STAGE 5, GFR LESS THAN 15 ML/MIN (HCC): Status: ACTIVE | Noted: 2024-06-10

## 2024-07-11 PROBLEM — E66.01 MORBID OBESITY (HCC): Status: ACTIVE | Noted: 2023-12-17

## 2024-07-11 PROBLEM — I10 ESSENTIAL HYPERTENSION: Status: ACTIVE | Noted: 2022-01-14

## 2024-07-11 PROBLEM — E11.9 DIABETES (HCC): Status: ACTIVE | Noted: 2023-12-17

## 2024-07-24 ENCOUNTER — OFFICE VISIT (OUTPATIENT)
Age: 54
End: 2024-07-24
Payer: COMMERCIAL

## 2024-07-24 VITALS
SYSTOLIC BLOOD PRESSURE: 153 MMHG | HEART RATE: 77 BPM | DIASTOLIC BLOOD PRESSURE: 81 MMHG | BODY MASS INDEX: 39.28 KG/M2 | HEIGHT: 69 IN | OXYGEN SATURATION: 97 %

## 2024-07-24 DIAGNOSIS — R53.81 MALAISE AND FATIGUE: ICD-10-CM

## 2024-07-24 DIAGNOSIS — E78.00 HYPERCHOLESTEREMIA: ICD-10-CM

## 2024-07-24 DIAGNOSIS — R01.1 SYSTOLIC MURMUR: ICD-10-CM

## 2024-07-24 DIAGNOSIS — Z99.2 ESRD (END STAGE RENAL DISEASE) ON DIALYSIS (HCC): ICD-10-CM

## 2024-07-24 DIAGNOSIS — R07.9 CHEST PAIN, UNSPECIFIED TYPE: ICD-10-CM

## 2024-07-24 DIAGNOSIS — R53.83 MALAISE AND FATIGUE: ICD-10-CM

## 2024-07-24 DIAGNOSIS — R07.89 OTHER CHEST PAIN: ICD-10-CM

## 2024-07-24 DIAGNOSIS — I10 PRIMARY HYPERTENSION: Primary | ICD-10-CM

## 2024-07-24 DIAGNOSIS — N18.6 ESRD (END STAGE RENAL DISEASE) ON DIALYSIS (HCC): ICD-10-CM

## 2024-07-24 DIAGNOSIS — E66.01 CLASS 2 SEVERE OBESITY DUE TO EXCESS CALORIES WITH SERIOUS COMORBIDITY AND BODY MASS INDEX (BMI) OF 38.0 TO 38.9 IN ADULT (HCC): ICD-10-CM

## 2024-07-24 DIAGNOSIS — E11.9 TYPE 2 DIABETES MELLITUS WITHOUT COMPLICATION, WITHOUT LONG-TERM CURRENT USE OF INSULIN (HCC): ICD-10-CM

## 2024-07-24 DIAGNOSIS — I51.89 DIASTOLIC DYSFUNCTION: ICD-10-CM

## 2024-07-24 PROCEDURE — 3077F SYST BP >= 140 MM HG: CPT | Performed by: INTERNAL MEDICINE

## 2024-07-24 PROCEDURE — 99215 OFFICE O/P EST HI 40 MIN: CPT | Performed by: INTERNAL MEDICINE

## 2024-07-24 PROCEDURE — 3079F DIAST BP 80-89 MM HG: CPT | Performed by: INTERNAL MEDICINE

## 2024-07-24 RX ORDER — SODIUM BICARBONATE 650 MG/1
650 TABLET ORAL 2 TIMES DAILY
COMMUNITY
Start: 2024-07-14

## 2024-07-24 RX ORDER — BLOOD-GLUCOSE SENSOR
EACH MISCELLANEOUS
COMMUNITY
Start: 2024-07-19

## 2024-07-24 ASSESSMENT — ENCOUNTER SYMPTOMS
SHORTNESS OF BREATH: 0
EYES NEGATIVE: 1
ALLERGIC/IMMUNOLOGIC NEGATIVE: 1
GASTROINTESTINAL NEGATIVE: 1

## 2024-07-24 NOTE — PROGRESS NOTES
1. \"Have you been to the ER, urgent care clinic since your last visit?  Hospitalized since your last visit?\" Reviewed by Dr. Lorenzo Cortez    2. \"Have you seen or consulted any other health care providers outside of the [facility] since your last visit?\" Reviewed by Dr. Lorenzo Cortez

## 2024-07-24 NOTE — PROGRESS NOTES
Debora Balalrd (:  1970) is a 54 y.o. female,Established patient, here for evaluation of the following chief complaint(s):  Follow-up (4month)    Subjective   SUBJECTIVE/OBJECTIVE:  HPI  Patient is here today for cardiac evaluation.  She has a history of multiple medical conditions to include hypertension, diabetes and hypercholesterolemia.  Patient is end-stage renal disease on hemodialysis.  Patient had a stress test performed in 2023 which showed a reversible defect but with questionable symptoms associated.  She is able to ambulate without restriction.  She does have left neck and shoulder and left-sided chest and upper extremity discomfort at times but is clearly atypical and not characteristic of a cardiac condition.  Her bigger issue has been excessive malaise and fatigue.  Minimal to no shortness of breath.  No palpitations.  No orthopnea.  She does have sleep apnea and is on CPAP and doing quite well in that regard.  No history of known coronary disease or systolic heart failure.    I have carefully reviewed all available medical records, previous office notes, lab, x-ray and procedure reports    Past Medical History:   Diagnosis Date    CHF (congestive heart failure) (HCC)     Diabetes mellitus (HCC)     Diabetic neuropathy (HCC)     Hypertension     MI (myocardial infarction) (HCC)     Microalbuminuria     Mixed hyperlipidemia     Obesity     Ovarian cancer (HCC)     Recurrent UTI     Sleep apnea treated with continuous positive airway pressure (CPAP)     Vitamin D deficiency         Past Surgical History:   Procedure Laterality Date    HYSTERECTOMY (CERVIX STATUS UNKNOWN)      OVARY REMOVAL      VAG HYST,UTERUS >250 GMS,REM TUBE/OVARY          No Known Allergies     Current Outpatient Medications   Medication Sig Dispense Refill    Continuous Glucose Sensor (FREESTYLE VAN 3 SENSOR) MISC

## 2024-10-21 ENCOUNTER — CLINICAL DOCUMENTATION (OUTPATIENT)
Facility: HOSPITAL | Age: 54
End: 2024-10-21

## 2024-10-21 NOTE — PROGRESS NOTES
Debora Ballard  Appointment: 10/21/2024 3:15 PM  Location: [facility]  Patient #: [id number]  : 1970   / Language: English / Race: Black or   Female      History of Present Illness (Juan March MD; 10/21/2024 4:18 PM)  The patient is a 54 year old female who presents for a Recheck of Chronic kidney disease.This is classified as stage 4.    Note:   Patient is a 52-year-old -American female who follows up post hospital stay for JENNIFER on CKD    Past medical history  #1 chronic kidney disease stage IV  #2 acute on chronic systolic and diastolic CHF  #3 Takotsubo's cardiomyopathy  #4 history of NSTEMI  #5 hypertension, diagnosed more than 10 years ago  #6 diabetes mellitus type 2 with complications, diagnosed several years ago, does have proteinuria, does have evidence of diabetic retinopathy  #7 dyslipidemia  #8 history of ovarian cancer s/p  #9 ckd4 , diabetic nephropathy/HTN    Interval history:  no SOB  edema +  no urinary problems  creat 3.5 ,  egfr 15  K 3.7  no edema  BP ok  MCR 51      Assessment & Plan (Juan March MD; 10/21/2024 4:22 PM)    Chronic kidney disease, stage 4 (severe) (N18.4) <HCCv24 137  HCCv28 327>  Impression: #1 Chronic kidney disease stage IV/5, etiology: Diabetic nephropathy and hypertension nephrosclerosis. Baseline creatinine stable in the 3 range , does have proteinuria ( 0.5 pcr) . Renal ultrasound and renal duplex were negative for any obstruction or renal artery stenosis  #2 diabetes mellitus type 2 with complications need better control. Goal hemoglobin A1c of 7 or below , patient had dialysis education --Prefers PD , will arrange a tour  #3 hypertension appears to be better controlled, continue present medications.  #4 chronic systolic and diastolic CHF, on Lasix, volume status ok  #5 Takotsubo cardiomyopathy  #6 history of NSTEMI  #7 metabolic acidoses  #8 sec hyperpara      Plan:  #1 monitor blood pressures at home, goal less than

## 2025-03-10 ENCOUNTER — TRANSCRIBE ORDERS (OUTPATIENT)
Facility: HOSPITAL | Age: 55
End: 2025-03-10

## 2025-03-10 DIAGNOSIS — R11.2 NAUSEA AND VOMITING, UNSPECIFIED VOMITING TYPE: ICD-10-CM

## 2025-03-10 DIAGNOSIS — Z79.4 DIABETES MELLITUS TYPE 2, INSULIN DEPENDENT (HCC): Primary | ICD-10-CM

## 2025-03-10 DIAGNOSIS — E11.9 DIABETES MELLITUS TYPE 2, INSULIN DEPENDENT (HCC): Primary | ICD-10-CM

## 2025-04-25 ENCOUNTER — CLINICAL DOCUMENTATION (OUTPATIENT)
Age: 55
End: 2025-04-25

## 2025-04-25 NOTE — PROGRESS NOTES
Received fax from [facility] with Dr. Juan March requesting for patient for new dialysis access. Patient was seen back by Dr. Anju Gee on February 2024, plan was for Left Upper Extremity Radiocephalic Arteriovenous Fistula Creation and needed clearance for surgery with the cardiologist. She was initially scheduled with Dr. Haider Handley on February 23, 2024 and was scheduled for Left Heart Cath on March 20, 2024 and was cancelled. We never heard back from the patient nor from the cardiologist.     Will have to redo the Bilateral Upper Extremity Vein Mapping as the last one was done back in February 4, 2024 and schedule an appointment with Dr. Gee to rediscuss surgery.     Called and left message at 4:00pm on 04/25/2025. Waiting for patient to call back.

## 2025-04-28 ENCOUNTER — TELEPHONE (OUTPATIENT)
Age: 55
End: 2025-04-28

## 2025-04-28 DIAGNOSIS — N18.6 ESRD (END STAGE RENAL DISEASE) (HCC): Primary | ICD-10-CM

## 2025-04-28 NOTE — TELEPHONE ENCOUNTER
Patient called back and informed patient that she needs to do a repeat ultrasound and reassess for a new dialysis access with Dr. Gee. Faxed cardiac clearance request to Dr. Lorenzo Cortez, cardiologist.     Gave patient the phone number for [facility] Scheduling at [phone] to call and schedule the Bilateral Upper Extremity Vein Mapping at [facility] or [facility]. Patient will call back for that appointment and needs a follow up appointment with Dr. Gee. Patient confirmed.

## 2025-04-28 NOTE — TELEPHONE ENCOUNTER
Called and left message on 04/28/2025 at 9:50am to schedule patient for Bilateral Upper Extremity Vein Mapping next available. Will have to schedule patient at [facility] due to availability unless there is an earlier opening. Will need to schedule patient to see Dr. Gee to rediscuss new access. Waiting for patient to call back.

## 2025-05-07 ENCOUNTER — TELEPHONE (OUTPATIENT)
Age: 55
End: 2025-05-07

## 2025-05-07 NOTE — TELEPHONE ENCOUNTER
Attempted to contact Debora Ballard at  number, no answer. Left a voicemail for Debora Ballard  to return the call to the office at [phone]. Patient has procedure pending and needs a follow-up appointment to be cleared. Will attempt another call at a later time.

## 2025-06-26 ENCOUNTER — OFFICE VISIT (OUTPATIENT)
Age: 55
End: 2025-06-26

## 2025-06-26 VITALS
HEART RATE: 81 BPM | OXYGEN SATURATION: 95 % | DIASTOLIC BLOOD PRESSURE: 54 MMHG | SYSTOLIC BLOOD PRESSURE: 116 MMHG | WEIGHT: 258 LBS | BODY MASS INDEX: 38.21 KG/M2 | HEIGHT: 69 IN

## 2025-06-26 DIAGNOSIS — I10 ESSENTIAL HYPERTENSION: ICD-10-CM

## 2025-06-26 DIAGNOSIS — I50.32 CHRONIC DIASTOLIC CHF (CONGESTIVE HEART FAILURE) (HCC): ICD-10-CM

## 2025-06-26 DIAGNOSIS — I25.10 CORONARY ARTERY DISEASE INVOLVING NATIVE CORONARY ARTERY OF NATIVE HEART WITHOUT ANGINA PECTORIS: ICD-10-CM

## 2025-06-26 DIAGNOSIS — N18.5 CKD (CHRONIC KIDNEY DISEASE) STAGE 5, GFR LESS THAN 15 ML/MIN (HCC): ICD-10-CM

## 2025-06-26 DIAGNOSIS — Z01.818 PREOPERATIVE CLEARANCE: Primary | ICD-10-CM

## 2025-06-26 DIAGNOSIS — G47.33 OBSTRUCTIVE SLEEP APNEA SYNDROME: ICD-10-CM

## 2025-06-26 ASSESSMENT — ENCOUNTER SYMPTOMS
SHORTNESS OF BREATH: 0
DIARRHEA: 0
ABDOMINAL PAIN: 0
RHINORRHEA: 0
VOMITING: 0
NAUSEA: 0
SORE THROAT: 0
WHEEZING: 0
COUGH: 0

## 2025-06-26 ASSESSMENT — PATIENT HEALTH QUESTIONNAIRE - PHQ9
SUM OF ALL RESPONSES TO PHQ QUESTIONS 1-9: 0
2. FEELING DOWN, DEPRESSED OR HOPELESS: NOT AT ALL

## 2025-06-26 NOTE — PROGRESS NOTES
Debora Ballard (:  1970) is a 55 y.o. female, with history significant for hypertension, chronic diastolic CHF, type 2 diabetes, ESRD, CAD who presents for preoperative clearance.  History of Present Illness  She is scheduled for a gastric bypass surgery, although the exact date is yet to be determined. The procedure will be performed by Dr. Kaushik Kamara at [facility]. She reports no respiratory or chest discomfort and maintains an active lifestyle, capable of walking several blocks without rest and ascending multiple flights of stairs. She has sleep apnea and uses a CPAP machine. She is on medication for blood pressure and diabetes. She is not currently on dialysis and has no plans to start. She takes aspirin.  She denies palpitation, abdominal pain, nausea, vomiting, fever/, PND, orthopnea, dizziness or lightheadedness, presyncope or syncope.  She does have mild bilateral lower extremity edema.      Relevant results  Echo 2025 -CONCLUSIONS    * Left ventricular chamber size is normal with moderate septal and mild posterior hypertrophy.    * Left ventricular systolic function is normal with no regional wall motion abnormalities and an ejection fraction of 63% by Valdivia's biplane.    * Left ventricular diastolic function: indeterminate.    * Right ventricular chamber dimension is mildly dilated at the basal segment with normal systolic function.    * No hemodynamically significant valvular disease.    * Unable to estimate pulmonary arterial pressure due to inadequate tricuspid regurgitant Doppler waveforms.    * Pericardium is normal in appearance with no evidence for significant pericardial effusion.    * For the indication of renal echo, findings are EF 63%.  Comparison    * No significant change since prior study from 2024.    CT calcium score 2024 -   Impression          1. Severe coronary artery calcification noted. Agatston calcium score of 1848 AU. Incidentally noted

## 2025-06-26 NOTE — PATIENT INSTRUCTIONS
kidney beans to salads. Make burritos and tacos with mashed cummins beans or black beans.  Where can you learn more?  Go to https://www.[employer].net/patientEd and enter H967 to learn more about \"DASH Diet: Care Instructions.\"  Current as of: October 7, 2024  Content Version: 14.5  © 8973-4915 [employer].   Care instructions adapted under license by [employer]. If you have questions about a medical condition or this instruction, always ask your healthcare professional. [employer] disclaims any warranty or liability for your use of this information.

## 2025-06-26 NOTE — PROGRESS NOTES
1. \"Have you been to the ER, urgent care clinic since your last visit?  Hospitalized since your last visit?\" Reviewed by RAJESH Mays  2. \"Have you seen or consulted any other health care providers outside of the [facility] since your last visit?\" Reviewed by RAJESH Mays

## 2025-07-07 ENCOUNTER — HOSPITAL ENCOUNTER (OUTPATIENT)
Facility: HOSPITAL | Age: 55
Discharge: HOME | End: 2025-07-10
Payer: MEDICAID

## 2025-07-07 VITALS — BODY MASS INDEX: 35.99 KG/M2 | WEIGHT: 243 LBS | HEIGHT: 69 IN

## 2025-07-07 DIAGNOSIS — Z12.31 VISIT FOR SCREENING MAMMOGRAM: ICD-10-CM

## 2025-07-07 PROCEDURE — 77063 BREAST TOMOSYNTHESIS BI: CPT

## 2025-07-18 PROBLEM — K44.9 HIATAL HERNIA: Status: ACTIVE | Noted: 2025-07-18

## 2025-07-18 PROBLEM — E66.812 CLASS 2 OBESITY: Status: ACTIVE | Noted: 2025-07-18

## 2025-07-18 PROBLEM — K76.0 HEPATIC STEATOSIS: Status: ACTIVE | Noted: 2025-07-18

## 2025-07-22 ENCOUNTER — TELEPHONE (OUTPATIENT)
Age: 55
End: 2025-07-22

## 2025-07-23 NOTE — ASSESSMENT & PLAN NOTE
Chronic, not at goal, not meeting compliance with current CPAP machine.  Will need to get updated home sleep apnea test and restart therapy

## 2025-07-24 ENCOUNTER — OFFICE VISIT (OUTPATIENT)
Age: 55
End: 2025-07-24
Payer: MEDICAID

## 2025-07-24 VITALS
DIASTOLIC BLOOD PRESSURE: 97 MMHG | OXYGEN SATURATION: 97 % | RESPIRATION RATE: 18 BRPM | HEART RATE: 107 BPM | HEIGHT: 69 IN | SYSTOLIC BLOOD PRESSURE: 224 MMHG | WEIGHT: 255 LBS | BODY MASS INDEX: 37.77 KG/M2 | TEMPERATURE: 97.6 F

## 2025-07-24 DIAGNOSIS — E66.812 CLASS 2 OBESITY: ICD-10-CM

## 2025-07-24 DIAGNOSIS — I25.2 HISTORY OF NON-ST ELEVATION MYOCARDIAL INFARCTION (NSTEMI): ICD-10-CM

## 2025-07-24 DIAGNOSIS — G47.33 OSA (OBSTRUCTIVE SLEEP APNEA): Primary | ICD-10-CM

## 2025-07-24 DIAGNOSIS — I10 ESSENTIAL HYPERTENSION: ICD-10-CM

## 2025-07-24 DIAGNOSIS — I50.33 ACUTE ON CHRONIC HEART FAILURE WITH PRESERVED EJECTION FRACTION (HFPEF) (HCC): ICD-10-CM

## 2025-07-24 PROCEDURE — 3079F DIAST BP 80-89 MM HG: CPT | Performed by: OTOLARYNGOLOGY

## 2025-07-24 PROCEDURE — 3077F SYST BP >= 140 MM HG: CPT | Performed by: OTOLARYNGOLOGY

## 2025-07-24 PROCEDURE — 99204 OFFICE O/P NEW MOD 45 MIN: CPT | Performed by: OTOLARYNGOLOGY

## 2025-07-24 RX ORDER — IRON PS COMPLEX/B12/FOLIC ACID 150-25-1
1 CAPSULE ORAL DAILY
COMMUNITY
Start: 2025-05-16

## 2025-07-24 RX ORDER — SULFAMETHOXAZOLE AND TRIMETHOPRIM 400; 80 MG/1; MG/1
1 TABLET ORAL 2 TIMES DAILY
COMMUNITY

## 2025-07-24 RX ORDER — AMLODIPINE BESYLATE 10 MG/1
TABLET ORAL
COMMUNITY
Start: 2025-07-23

## 2025-07-24 RX ORDER — FLUTICASONE PROPIONATE 50 MCG
SPRAY, SUSPENSION (ML) NASAL
COMMUNITY
Start: 2025-07-06

## 2025-07-24 ASSESSMENT — PATIENT HEALTH QUESTIONNAIRE - PHQ9
2. FEELING DOWN, DEPRESSED OR HOPELESS: NOT AT ALL
SUM OF ALL RESPONSES TO PHQ QUESTIONS 1-9: 0
1. LITTLE INTEREST OR PLEASURE IN DOING THINGS: NOT AT ALL

## 2025-07-24 ASSESSMENT — SLEEP AND FATIGUE QUESTIONNAIRES
HOW LIKELY ARE YOU TO NOD OFF OR FALL ASLEEP WHILE SITTING AND TALKING TO SOMEONE: WOULD NEVER DOZE
HOW LIKELY ARE YOU TO NOD OFF OR FALL ASLEEP WHILE SITTING QUIETLY AFTER LUNCH WITHOUT ALCOHOL: MODERATE CHANCE OF DOZING
ESS TOTAL SCORE: 6
HOW LIKELY ARE YOU TO NOD OFF OR FALL ASLEEP WHILE LYING DOWN TO REST IN THE AFTERNOON WHEN CIRCUMSTANCES PERMIT: SLIGHT CHANCE OF DOZING
HOW LIKELY ARE YOU TO NOD OFF OR FALL ASLEEP WHILE SITTING INACTIVE IN A PUBLIC PLACE: WOULD NEVER DOZE
HOW LIKELY ARE YOU TO NOD OFF OR FALL ASLEEP WHILE WATCHING TV: SLIGHT CHANCE OF DOZING
HOW LIKELY ARE YOU TO NOD OFF OR FALL ASLEEP IN A CAR, WHILE STOPPED FOR A FEW MINUTES IN TRAFFIC: WOULD NEVER DOZE
HOW LIKELY ARE YOU TO NOD OFF OR FALL ASLEEP WHILE SITTING AND READING: SLIGHT CHANCE OF DOZING
HOW LIKELY ARE YOU TO NOD OFF OR FALL ASLEEP WHEN YOU ARE A PASSENGER IN A CAR FOR AN HOUR WITHOUT A BREAK: SLIGHT CHANCE OF DOZING

## 2025-07-24 NOTE — PATIENT INSTRUCTIONS
Please make a follow up appointment to discuss the results of your sleep study or I will send you a \"my chart\" message with your results and treatment options.     The [facility] Sleep Lab is located in the [facility], adjacent to [facility]. The lab is on the second floor. The direct number to call for sleep study related questions is: [phone].    Please call our clinic back at [phone] or send a message on [employer] if you have any questions or concerns or if you are experiencing any of the following:     You have not received a follow up appointment within 30 days prior the recommended follow up time.    If you are not tolerating treatment plan and/or not able to obtain equipment or prescribed medication(s).  if you are experiencing any difficulties with the Durable Medical Equipment  (DME) Company you may be using or is assigned to you.  Two weeks have passed and you have not received an appointment for a scheduled procedure.  Two weeks have passed since you underwent a test and/or procedure and you have not received your results.  Your  Durable Medical Equipment (DME ) company is supposed to provide you with replacement filters, tubing and masks. You can either call your DME when you need new supplies or you can arrange for an automatic shipment schedule.    Your need to be seen by our office at lat minimum of every 12 months in order to renew the prescription for these supplies.   Please make note of who your DME company is and their phone number.   Please make sure that you clean your mask and hosing on a regular basis.  Your DME can provide you with additional information regarding proper care and cleaning of your device     Safety is strongly encouraged.  You should not drive if sleepy, tired, distracted and/or fatigued.      Chest Xrays and blood work do not require appointments.  They are considered \"Walk-In\" services and can be obtained at either [facility] or [facility]

## 2025-07-24 NOTE — PROGRESS NOTES
Debora Friaslake presents today for   Chief Complaint   Patient presents with    Sleep Apnea    Hypertension       Is someone accompanying this pt? no    Is the patient using any DME equipment during OV? no    -DME Company: [employer]     Have you ever had a sleep study done before? yes    Depression Screenin/24/2025    10:34 AM   PHQ-9    Little interest or pleasure in doing things 0   Feeling down, depressed, or hopeless 0   PHQ-2 Score 0   PHQ-9 Total Score 0        Darrington Sleepiness Scale:      2025    10:37 AM   Sleep Medicine   Sitting and reading 1   Watching TV 1   Sitting, inactive in a public place (e.g. a theatre or a meeting) 0   As a passenger in a car for an hour without a break 1   Lying down to rest in the afternoon when circumstances permit 1   Sitting and talking to someone 0   Sitting quietly after a lunch without alcohol 2   In a car, while stopped for a few minutes in traffic 0   Darrington Sleepiness Score 6   Neck (Inches) 12.5       Stop-Ban/24/2025    10:00 AM   STOP-BANG QUESTIONNAIRE   Are you a loud and/or regular snorer? 1   Do you often feel tired or groggy upon awakening or do you awaken with a headache? 1   Have you been observed to gasp or stop breathing during sleep? 0   Are you often tired or fatigued during wake time hours?  0   Do you fall asleep sitting, reading, watching TV or driving? 0   Do you often have problems with memory or concentration? 0   Do you have or are you being treated for high blood pressure? 1   Recent BMI (Calculated) 36   Is BMI greater than 35 kg/m2? 1=Yes   Age older than 50 years old? 1=Yes   Is your neck circumference greater than 17 inches (Male) or 16 inches (Female)? 0   Gender - Male 0=No   STOP-Bang Total Score 41         Coordination of Care:  1. Have you been to the ER, urgent care clinic since your last visit? Hospitalized since your last visit? no    2. Have you seen or consulted any other health care providers outside of 
Appearance: Normal appearance.   HENT:      Head: Normocephalic and atraumatic.   Cardiovascular:      Rate and Rhythm: Normal rate.   Pulmonary:      Effort: Pulmonary effort is normal.   Neurological:      General: No focal deficit present.      Mental Status: She is alert and oriented to person, place, and time.   Psychiatric:         Mood and Affect: Mood normal.         Behavior: Behavior normal.       Data reviewed:    Hemoglobin A1C   Date Value Ref Range Status   12/12/2024 8.7 (H) 3.8 - 5.6 % Final     Comment:       DIABETIC = GREATER THAN OR EQUALS TO 6.5%    PREDIABETIC = 5.7 - 6.4%                      IF A1C % IS:               YOUR ESTIMATED AVERAGE GLUCOSE IS:    4.0                        68 mg/dL    4.5                        82 mg/dL    5.0                        97 mg/dL    5.5                        111 mg/dL    6                          126 mg/dL    6.5                        140 mg/dL    7                          154 mg/dL    7.5                        169 mg/dL    8                          183 mg/dL    8.5                        197 mg/dL    9                          212 mg/dL    9.5                        226 mg/dL    10                         240 mg/dL    10.5                       255 mg/dL    11                         269 mg/dL    11.5                       283 mg/dL    12                         298 mg/dL          Lab Results   Component Value Date/Time     01/17/2025 11:32 AM    K 5.7 01/17/2025 11:32 AM     01/17/2025 11:32 AM    CO2 21 12/12/2024 06:03 PM    BUN 65 01/17/2025 11:32 AM    CREATININE 4.1 01/17/2025 11:32 AM    GLUCOSE 243 01/17/2025 11:32 AM    CALCIUM 9.1 12/12/2024 06:03 PM    LABGLOM 12 12/12/2024 06:03 PM         Lab Results   Component Value Date    FERRITIN 100.7 12/12/2024      Cardiac echo dated 2/21/25    * Left ventricular chamber size is normal with moderate septal and mild posterior hypertrophy.     * Left ventricular systolic function is

## 2025-07-24 NOTE — ASSESSMENT & PLAN NOTE
Chronic, worsening (exacerbation), continue current treatment plan, patient's blood pressure was extremely high today, 224/97.  She was mostly asymptomatic, complained of mild headache but no other symptoms.  She also stated \"I have to go to the pharmacy after this and  my other blood pressure medicine and start taking it\".  She does have a PCP and cardiology managing.  Would need to seek urgent care if her headache gets worse or where she has lightheadedness or visual changes.

## 2025-07-29 ENCOUNTER — CLINICAL DOCUMENTATION (OUTPATIENT)
Age: 55
End: 2025-07-29

## 2025-07-29 NOTE — PROGRESS NOTES
[facility]:   Order sent to [facility]-.